# Patient Record
Sex: FEMALE | Race: BLACK OR AFRICAN AMERICAN | Employment: FULL TIME | RURAL
[De-identification: names, ages, dates, MRNs, and addresses within clinical notes are randomized per-mention and may not be internally consistent; named-entity substitution may affect disease eponyms.]

---

## 2017-08-21 ENCOUNTER — DOCUMENTATION ONLY (OUTPATIENT)
Dept: FAMILY MEDICINE CLINIC | Age: 47
End: 2017-08-21

## 2017-08-21 ENCOUNTER — OFFICE VISIT (OUTPATIENT)
Dept: FAMILY MEDICINE CLINIC | Age: 47
End: 2017-08-21

## 2017-08-21 VITALS
HEART RATE: 78 BPM | HEIGHT: 63 IN | RESPIRATION RATE: 18 BRPM | SYSTOLIC BLOOD PRESSURE: 134 MMHG | DIASTOLIC BLOOD PRESSURE: 84 MMHG | BODY MASS INDEX: 37.96 KG/M2 | WEIGHT: 214.25 LBS | OXYGEN SATURATION: 98 %

## 2017-08-21 DIAGNOSIS — Z23 IMMUNIZATION DUE: ICD-10-CM

## 2017-08-21 DIAGNOSIS — F41.8 DEPRESSION WITH ANXIETY: ICD-10-CM

## 2017-08-21 DIAGNOSIS — F41.9 ANXIETY: ICD-10-CM

## 2017-08-21 DIAGNOSIS — Z00.00 WELL ADULT EXAM: Primary | ICD-10-CM

## 2017-08-21 RX ORDER — LORAZEPAM 0.5 MG/1
TABLET ORAL
Qty: 60 TAB | Refills: 0 | OUTPATIENT
Start: 2017-08-21 | End: 2017-09-12

## 2017-08-21 NOTE — MR AVS SNAPSHOT
Visit Information Date & Time Provider Department Dept. Phone Encounter #  
 8/21/2017  8:00 AM Luis English MD [facility] [phone] [id number] Follow-up Instructions Return in about 1 year (around 8/21/2018). Upcoming Health Maintenance Date Due Pneumococcal 19-64 Medium Risk (1 of 1 - PPSV23) 11/23/1988 DTaP/Tdap/Td series (1 - Tdap) 11/23/1990 INFLUENZA AGE 9 TO ADULT 8/1/2017 PAP AKA CERVICAL CYTOLOGY 8/21/2020 Allergies as of 8/21/2017  Review Complete On: 8/21/2017 By: Luis English MD  
 No Known Allergies Current Immunizations  Never Reviewed Name Date Tdap 8/21/2017 Not reviewed this visit You Were Diagnosed With   
  
 Codes Comments Well adult exam    -  Primary ICD-10-CM: Z00.00 ICD-9-CM: V70.0 Anxiety     ICD-10-CM: F41.9 ICD-9-CM: 300.00 BMI 37.0-37.9, adult     ICD-10-CM: Z68.37 ICD-9-CM: V85.37 Depression with anxiety     ICD-10-CM: F41.8 ICD-9-CM: 300.4 Immunization due     ICD-10-CM: Z23 ICD-9-CM: V05.9 Vitals BP Pulse Resp Height(growth percentile) Weight(growth percentile) SpO2  
 134/84 (BP 1 Location: Right arm) 78 18 5' 3\" (1.6 m) 214 lb 4 oz (97.2 kg) 98% BMI OB Status Smoking Status 37.95 kg/m2 Having regular periods Never Smoker Vitals History BMI and BSA Data Body Mass Index Body Surface Area  
 37.95 kg/m 2 2.08 m 2 Preferred Pharmacy Pharmacy Name Phone [facility] [place] [address] [place] - [address] [phone] Your Updated Medication List  
  
   
This list is accurate as of: 8/21/17  8:43 AM.  Always use your most recent med list.  
  
  
  
  
 LORazepam 0.5 mg tablet Commonly known as:  ATIVAN  
TAKE ONE TABLET BY MOUTH EVERY 6 TO 8 HOURS AS NEEDED FOR ANXIETY  
  
 multivitamin tablet Commonly known as:  ONE A DAY Take 1 Tab by mouth daily. We Performed the Following CBC WITH AUTOMATED DIFF [21213 CPT(R)] COLLECTION VENOUS BLOOD,VENIPUNCTURE [id number] CPT(R)] LIPID PANEL [19397 CPT(R)] METABOLIC PANEL, COMPREHENSIVE [39826 CPT(R)] TETANUS, DIPHTHERIA TOXOIDS AND ACELLULAR PERTUSSIS VACCINE (TDAP), IN INDIVIDS. >=7, IM [id number] CPT(R)] TSH 3RD GENERATION [64542 CPT(R)] VITAMIN D, 25 HYDROXY [id number] CPT(R)] Follow-up Instructions Return in about 1 year (around 8/21/2018). To-Do List   
 08/21/2017 Imaging:  [facility] BI SCREENING INCL CAD Introducing [facility]! Doug Barba introduces inTarvo patient portal. Now you can access parts of your medical record, email your doctor's office, and request medication refills online. 1. In your internet browser, go to [email or web address] 2. Click on the First Time User? Click Here link in the Sign In box. You will see the New Member Sign Up page. 3. Enter your inTarvo Access Code exactly as it appears below. You will not need to use this code after youve completed the sign-up process. If you do not sign up before the expiration date, you must request a new code. · inTarvo Access Code: [email or web address] Expires: 11/19/2017  8:15 AM 
 
4. Enter the last four digits of your Social Security Number (xxxx) and Date of Birth (mm/dd/yyyy) as indicated and click Submit. You will be taken to the next sign-up page. 5. Create a inTarvo ID. This will be your inTarvo login ID and cannot be changed, so think of one that is secure and easy to remember. 6. Create a inTarvo password. You can change your password at any time. 7. Enter your Password Reset Question and Answer. This can be used at a later time if you forget your password. 8. Enter your e-mail address. You will receive e-mail notification when new information is available in [address] 9. Click Sign Up. You can now view and download portions of your medical record. 10. Click the Download Summary menu link to download a portable copy of your medical information. If you have questions, please visit the Frequently Asked Questions section of the documistic website. Remember, documistic is NOT to be used for urgent needs. For medical emergencies, dial 911. Now available from your iPhone and Android! Please provide this summary of care documentation to your next provider. Your primary care clinician is listed as Gulshan Figueroa. If you have any questions after today's visit, please call [phone].

## 2017-08-21 NOTE — PROGRESS NOTES
HISTORY OF PRESENT ILLNESS  Agueda rArieta is a 51 y.o. female. HPI  She is here for CPE. Doing well. Off meds for depression and doing well. Sees Dr Virgie Dailey for GYN care. Needs mammogram. LMP in November. Some symptoms of menopause. Reflux is stable without meds. She has gained a bit of weight over the last year. Not exercising. Does not drink sodas or alcohol. Review of Systems   Constitutional: Positive for malaise/fatigue. Negative for fever and weight loss. HENT: Negative for congestion, hearing loss, sinus pain and sore throat. Respiratory: Negative for cough, sputum production and wheezing. Cardiovascular: Negative for chest pain, palpitations and leg swelling. Gastrointestinal: Negative for abdominal pain, blood in stool, constipation, diarrhea, heartburn and nausea. Genitourinary: Negative for dysuria and urgency. Musculoskeletal: Negative for back pain and myalgias. Skin: Negative for rash. Neurological: Negative for dizziness, sensory change and headaches. Psychiatric/Behavioral: Negative for depression. The patient is nervous/anxious. The patient does not have insomnia. Improving     Past Medical History:   Diagnosis Date    Asthma     Depression     Depression with anxiety 5/12/2015    GERD (gastroesophageal reflux disease)      Past Surgical History:   Procedure Laterality Date    HX GYN  1994    tubal ligation     No Known Allergies  Blood pressure 134/84, pulse 78, resp. rate 18, height 5' 3\" (1.6 m), weight 214 lb 4 oz (97.2 kg), SpO2 98 %. Physical Exam   Constitutional: She is oriented to person, place, and time. She appears well-developed and well-nourished. No distress. HENT:   Head: Normocephalic and atraumatic. Right Ear: External ear normal.   Left Ear: External ear normal.   Nose: Nose normal.   Mouth/Throat: Oropharynx is clear and moist.   Eyes: Conjunctivae are normal.   Neck: Neck supple.    Cardiovascular: Normal rate, regular rhythm and normal heart sounds. Pulmonary/Chest: Effort normal and breath sounds normal. No respiratory distress. Abdominal: Soft. Bowel sounds are normal. She exhibits no distension. There is no tenderness. Genitourinary:   Genitourinary Comments: Breast exam: symmetrical, no masses palpated. No nipple discharge. No adenopathy. Musculoskeletal: Normal range of motion. Lymphadenopathy:     She has no cervical adenopathy. Neurological: She is alert and oriented to person, place, and time. Skin: Skin is warm. Psychiatric: She has a normal mood and affect. Her behavior is normal.   Vitals reviewed.       ASSESSMENT and PLAN  Well Exam   Check labs   Pap with Dr Ulisses Brito  BMI 37   Discussed diet and exercise   Plan to recheck in 6 months to see how she is progressing  Immun Due   Tdap today  Anxiety with depression- stable   Refill Ativan as needed #60   ADRs discussed

## 2017-08-22 LAB
25(OH)D3+25(OH)D2 SERPL-MCNC: 31.9 NG/ML (ref 30–100)
ALBUMIN SERPL-MCNC: 4 G/DL (ref 3.5–5.5)
ALBUMIN/GLOB SERPL: 1.4 {RATIO} (ref 1.2–2.2)
ALP SERPL-CCNC: 88 IU/L (ref 39–117)
ALT SERPL-CCNC: 23 IU/L (ref 0–32)
AST SERPL-CCNC: 23 IU/L (ref 0–40)
BASOPHILS # BLD AUTO: 0 X10E3/UL (ref 0–0.2)
BASOPHILS NFR BLD AUTO: 0 %
BILIRUB SERPL-MCNC: 0.6 MG/DL (ref 0–1.2)
BUN SERPL-MCNC: 14 MG/DL (ref 6–24)
BUN/CREAT SERPL: 20 (ref 9–23)
CALCIUM SERPL-MCNC: 9.2 MG/DL (ref 8.7–10.2)
CHLORIDE SERPL-SCNC: 105 MMOL/L (ref 96–106)
CHOLEST SERPL-MCNC: 182 MG/DL (ref 100–199)
CO2 SERPL-SCNC: 26 MMOL/L (ref 18–29)
CREAT SERPL-MCNC: 0.69 MG/DL (ref 0.57–1)
EOSINOPHIL # BLD AUTO: 1.6 X10E3/UL (ref 0–0.4)
EOSINOPHIL NFR BLD AUTO: 20 %
ERYTHROCYTE [DISTWIDTH] IN BLOOD BY AUTOMATED COUNT: 13.6 % (ref 12.3–15.4)
GLOBULIN SER CALC-MCNC: 2.9 G/DL (ref 1.5–4.5)
GLUCOSE SERPL-MCNC: 90 MG/DL (ref 65–99)
HCT VFR BLD AUTO: 39.8 % (ref 34–46.6)
HDLC SERPL-MCNC: 54 MG/DL
HGB BLD-MCNC: 13.2 G/DL (ref 11.1–15.9)
IMM GRANULOCYTES # BLD: 0 X10E3/UL (ref 0–0.1)
IMM GRANULOCYTES NFR BLD: 0 %
INTERPRETATION, 910389: NORMAL
LDLC SERPL CALC-MCNC: 116 MG/DL (ref 0–99)
LYMPHOCYTES # BLD AUTO: 2.8 X10E3/UL (ref 0.7–3.1)
LYMPHOCYTES NFR BLD AUTO: 35 %
MCH RBC QN AUTO: 29 PG (ref 26.6–33)
MCHC RBC AUTO-ENTMCNC: 33.2 G/DL (ref 31.5–35.7)
MCV RBC AUTO: 88 FL (ref 79–97)
MONOCYTES # BLD AUTO: 0.3 X10E3/UL (ref 0.1–0.9)
MONOCYTES NFR BLD AUTO: 4 %
MORPHOLOGY BLD-IMP: ABNORMAL
NEUTROPHILS # BLD AUTO: 3.2 X10E3/UL (ref 1.4–7)
NEUTROPHILS NFR BLD AUTO: 41 %
PLATELET # BLD AUTO: 278 X10E3/UL (ref 150–379)
POTASSIUM SERPL-SCNC: 4.4 MMOL/L (ref 3.5–5.2)
PROT SERPL-MCNC: 6.9 G/DL (ref 6–8.5)
RBC # BLD AUTO: 4.55 X10E6/UL (ref 3.77–5.28)
SODIUM SERPL-SCNC: 145 MMOL/L (ref 134–144)
TRIGL SERPL-MCNC: 59 MG/DL (ref 0–149)
TSH SERPL DL<=0.005 MIU/L-ACNC: 1.96 UIU/ML (ref 0.45–4.5)
VLDLC SERPL CALC-MCNC: 12 MG/DL (ref 5–40)
WBC # BLD AUTO: 7.9 X10E3/UL (ref 3.4–10.8)

## 2017-09-19 ENCOUNTER — OFFICE VISIT (OUTPATIENT)
Dept: FAMILY MEDICINE CLINIC | Age: 47
End: 2017-09-19

## 2017-09-19 VITALS
BODY MASS INDEX: 38.36 KG/M2 | OXYGEN SATURATION: 98 % | TEMPERATURE: 97.3 F | SYSTOLIC BLOOD PRESSURE: 130 MMHG | DIASTOLIC BLOOD PRESSURE: 84 MMHG | RESPIRATION RATE: 18 BRPM | HEART RATE: 70 BPM | HEIGHT: 63 IN | WEIGHT: 216.5 LBS

## 2017-09-19 DIAGNOSIS — M54.40 ACUTE RIGHT-SIDED LOW BACK PAIN WITH SCIATICA, SCIATICA LATERALITY UNSPECIFIED: Primary | ICD-10-CM

## 2017-09-19 RX ORDER — PREDNISONE 10 MG/1
10 TABLET ORAL SEE ADMIN INSTRUCTIONS
Qty: 21 TAB | Refills: 0 | Status: SHIPPED | OUTPATIENT
Start: 2017-09-19 | End: 2018-09-10 | Stop reason: ALTCHOICE

## 2017-09-19 NOTE — PROGRESS NOTES
HISTORY OF PRESENT ILLNESS  Tania Dykes is a 51 y.o. female. HPI  She is here for a follow up visit from the ER. Sse was seen a week ago with a horrible headache. CT of the head was negative except for a dental infection. Labs were normal. Treated with Augmentin. Headache is better. She is now having lower back pain radiating down her legs to her mid calf. Pain occurs when bending down or changing positions. No injury noted. Review of Systems   Constitutional: Positive for malaise/fatigue. Negative for fever. HENT: Negative for congestion and sore throat. Respiratory: Negative for cough. Cardiovascular: Negative for chest pain and palpitations. Gastrointestinal: Negative for abdominal pain. Musculoskeletal: Positive for back pain. Neurological: Positive for headaches. Negative for dizziness and sensory change. Past Medical History:   Diagnosis Date    Asthma     Depression     Depression with anxiety 5/12/2015    GERD (gastroesophageal reflux disease)      Past Surgical History:   Procedure Laterality Date    HX GYN  1994    tubal ligation     No Known Allergies  Blood pressure 130/84, pulse 70, temperature 97.3 °F (36.3 °C), temperature source Oral, resp. rate 18, height 5' 2.5\" (1.588 m), weight 216 lb 8 oz (98.2 kg), SpO2 98 %. Physical Exam   Constitutional: She is oriented to person, place, and time. She appears well-developed and well-nourished. No distress. HENT:   Head: Normocephalic. Right Ear: External ear normal.   Left Ear: External ear normal.   Nose: Nose normal.   Mouth/Throat: Oropharynx is clear and moist.   Neck: Neck supple. Cardiovascular: Normal rate, regular rhythm and normal heart sounds. Pulmonary/Chest: Effort normal and breath sounds normal. No respiratory distress. Musculoskeletal:   Pain with flexion and extension. DTRs 2+ bilaterally. Negative SLRT bilaterally. No pain with rotation of the hips.     Neurological: She is alert and oriented to person, place, and time. Skin: Skin is warm. Psychiatric: She has a normal mood and affect. Vitals reviewed. Results for orders placed or performed during the hospital encounter of 09/12/17   CBC WITH AUTOMATED DIFF   Result Value Ref Range    WBC 9.7 3.6 - 11.0 K/uL    RBC 4.61 3.80 - 5.20 M/uL    HGB 13.6 11.5 - 16.0 g/dL    HCT 40.4 35.0 - 47.0 %    MCV 87.6 80.0 - 99.0 FL    MCH 29.5 26.0 - 34.0 PG    MCHC 33.7 30.0 - 36.5 g/dL    RDW 12.8 11.5 - 14.5 %    PLATELET 799 223 - 790 K/uL    NEUTROPHILS 62 32 - 75 %    LYMPHOCYTES 29 12 - 49 %    MONOCYTES 6 5 - 13 %    EOSINOPHILS 3 0 - 7 %    BASOPHILS 0 0 - 1 %    ABS. NEUTROPHILS 6.1 1.8 - 8.0 K/UL    ABS. LYMPHOCYTES 2.8 0.8 - 3.5 K/UL    ABS. MONOCYTES 0.5 0.0 - 1.0 K/UL    ABS. EOSINOPHILS 0.2 0.0 - 0.4 K/UL    ABS. BASOPHILS 0.0 0.0 - 0.1 K/UL   METABOLIC PANEL, COMPREHENSIVE   Result Value Ref Range    Sodium 142 136 - 145 mmol/L    Potassium 4.1 3.5 - 5.1 mmol/L    Chloride 106 97 - 108 mmol/L    CO2 28 21 - 32 mmol/L    Anion gap 8 5 - 15 mmol/L    Glucose 110 (H) 65 - 100 mg/dL    BUN 12 7.0 - 18.0 MG/DL    Creatinine 0.81 0.55 - 1.02 MG/DL    BUN/Creatinine ratio 15 12 - 20      GFR est AA >60 >60 ml/min/1.73m2    GFR est non-AA >60 >60 ml/min/1.73m2    Calcium 9.5 8.5 - 10.1 MG/DL    Bilirubin, total 0.8 0.2 - 1.0 MG/DL    ALT (SGPT) 29 14 - 63 U/L    AST (SGOT) 23 15 - 37 U/L    Alk.  phosphatase 77 45 - 117 U/L    Protein, total 7.5 6.4 - 8.2 g/dL    Albumin 3.4 (L) 3.5 - 5.0 g/dL    Globulin 4.1 (H) 2.0 - 4.0 g/dL    A-G Ratio 0.8 (L) 1.1 - 2.2     PROTHROMBIN TIME + INR   Result Value Ref Range    INR 1.0 0.9 - 1.1      Prothrombin time 11.7 10.0 - 13.0 sec   PTT   Result Value Ref Range    aPTT 24.6 23.0 - 33.5 SEC       ASSESSMENT and PLAN  Lumbar back pain with radiation down both legs   Get Xrays of the lumbar spine   SteraPred taper over 6 days   RTO or call if no improvement

## 2018-09-10 PROBLEM — F51.01 PRIMARY INSOMNIA: Status: ACTIVE | Noted: 2018-09-10

## 2018-09-10 PROBLEM — E66.01 OBESITY, MORBID (HCC): Status: ACTIVE | Noted: 2018-09-10

## 2019-06-26 PROBLEM — K42.9 UMBILICAL HERNIA: Chronic | Status: ACTIVE | Noted: 2019-06-26

## 2019-06-27 PROBLEM — N85.2 ENLARGED UTERUS: Chronic | Status: ACTIVE | Noted: 2019-06-27

## 2019-06-27 PROBLEM — K76.0 FATTY INFILTRATION OF LIVER: Chronic | Status: ACTIVE | Noted: 2019-06-27

## 2019-07-15 ENCOUNTER — HOSPITAL ENCOUNTER (EMERGENCY)
Age: 49
Discharge: HOME | End: 2019-07-15
Attending: EMERGENCY MEDICINE
Payer: COMMERCIAL

## 2019-07-15 VITALS
WEIGHT: 218.92 LBS | RESPIRATION RATE: 14 BRPM | DIASTOLIC BLOOD PRESSURE: 80 MMHG | SYSTOLIC BLOOD PRESSURE: 122 MMHG | HEART RATE: 80 BPM | OXYGEN SATURATION: 99 % | HEIGHT: 63 IN | BODY MASS INDEX: 38.79 KG/M2 | TEMPERATURE: 97.7 F

## 2019-07-15 DIAGNOSIS — K21.9 GASTROESOPHAGEAL REFLUX DISEASE WITHOUT ESOPHAGITIS: Primary | ICD-10-CM

## 2019-07-15 DIAGNOSIS — R11.0 NAUSEA WITHOUT VOMITING: ICD-10-CM

## 2019-07-15 PROCEDURE — 74011250637 HC RX REV CODE- 250/637: Performed by: NURSE PRACTITIONER

## 2019-07-15 PROCEDURE — 74011000250 HC RX REV CODE- 250: Performed by: NURSE PRACTITIONER

## 2019-07-15 PROCEDURE — 93005 ELECTROCARDIOGRAM TRACING: CPT

## 2019-07-15 PROCEDURE — 99284 EMERGENCY DEPT VISIT MOD MDM: CPT

## 2019-07-15 RX ORDER — OMEPRAZOLE 20 MG/1
20 CAPSULE, DELAYED RELEASE ORAL 2 TIMES DAILY
Qty: 30 CAP | Refills: 0 | Status: SHIPPED | OUTPATIENT
Start: 2019-07-15 | End: 2019-07-31 | Stop reason: DRUGHIGH

## 2019-07-15 RX ORDER — ALUMINA, MAGNESIA, AND SIMETHICONE 2400; 2400; 240 MG/30ML; MG/30ML; MG/30ML
10 SUSPENSION ORAL
Qty: 240 ML | Refills: 0 | Status: SHIPPED | OUTPATIENT
Start: 2019-07-15 | End: 2020-11-04 | Stop reason: ALTCHOICE

## 2019-07-15 RX ORDER — OMEPRAZOLE 20 MG/1
20 CAPSULE, DELAYED RELEASE ORAL 2 TIMES DAILY
Qty: 30 CAP | Refills: 0 | Status: SHIPPED | OUTPATIENT
Start: 2019-07-15 | End: 2019-07-15

## 2019-07-15 RX ADMIN — ALUMINUM HYDROXIDE AND MAGNESIUM HYDROXIDE 40 ML: 200; 200 SUSPENSION ORAL at 20:57

## 2019-07-15 NOTE — ED TRIAGE NOTES
Being treated for gastric reflux for 3 weeks on Prilosec. But continues to have chest pain and pressure stomach whenever she eats. 4 lb weight loss in past 3 weeks.

## 2019-07-15 NOTE — LETTER
[address] 
[facility] EMERGENCY DEPT 
[address] [address] 
[phone] Work/School Note Date: 7/15/2019 To Whom It May concern: 
 
Cesar Madden was seen and treated today in the emergency room by the following provider(s): 
Attending Provider: Diego Hollins MD 
Nurse Practitioner: Jeremias Hurd NP. Cesar Madden may return to work on 7/18/19.  
 
Sincerely, 
 
 
 
 
July East RN

## 2019-07-16 LAB
ATRIAL RATE: 77 BPM
CALCULATED P AXIS, ECG09: 61 DEGREES
CALCULATED R AXIS, ECG10: 28 DEGREES
CALCULATED T AXIS, ECG11: 22 DEGREES
DIAGNOSIS, 93000: NORMAL
P-R INTERVAL, ECG05: 146 MS
Q-T INTERVAL, ECG07: 402 MS
QRS DURATION, ECG06: 72 MS
QTC CALCULATION (BEZET), ECG08: 454 MS
VENTRICULAR RATE, ECG03: 77 BPM

## 2019-07-16 NOTE — ED PROVIDER NOTES
EMERGENCY DEPARTMENT HISTORY AND PHYSICAL EXAM      Date: 7/15/2019  Patient Name: Lexie Lucas    History of Presenting Illness     Chief Complaint   Patient presents with    Abdominal Pain       History Provided By: Patient    HPI: Lexie Lucas, 51 y.o. female with PMHx significant for GERD, presents by POV to the ED with cc of burning sensation for the past couple of days. Seen the PCP on June 16, given Prilosec for management. Patient states she has been taking the Prilosec for almost a month now with some relief. States she takes the Prilosec early in the morning however, throughout the day she starts to feel the medication wear off and she begins to feel a burning sensation along with some nausea. Also states her appetite has changed because she does not want to eat much during burning sensation in her chest.  States she does not go back to her primary care doctor for about another week or so and does not think she can make it until she sees the doctor next week. This evening the patient describes a burning sensation very similar to previous experience with acid reflux. States she just wanted a second opinion. Patient denies chest pain, shortness of breath, abdominal pain, however, she is describing nausea without vomiting, and acid taste within her mouth. Patient states she took her prescribed dosage early this morning however, as she described earlier, the burning sensation has come back. There are no other complaints, changes, or physical findings at this time. PCP: Irma Phillips MD    No current facility-administered medications on file prior to encounter. Current Outpatient Medications on File Prior to Encounter   Medication Sig Dispense Refill    omeprazole (PRILOSEC) 40 mg capsule Take 1 Cap by mouth daily. 30 Cap 3    ondansetron (ZOFRAN ODT) 8 mg disintegrating tablet Take 1 Tab by mouth every eight (8) hours as needed for Nausea.  30 Tab 0    polyethylene glycol (MIRALAX) 17 gram packet Take 1 Packet by mouth daily as needed (constipation, abdominal fullness). 14 Packet 3    LORazepam (ATIVAN) 0.5 mg tablet TAKE ONE TABLET BY MOUTH EVERY 6 TO 8 HOURS AS NEEDED FOR ANXIETY 30 Tab 0    docosahexanoic acid/epa (FISH OIL PO) Take  by mouth.  BIOTIN PO Take  by mouth.  multivitamin (ONE A DAY) tablet Take 1 Tab by mouth daily. Past History     Past Medical History:  Past Medical History:   Diagnosis Date    Asthma     Depression     Depression with anxiety 5/12/2015    GERD (gastroesophageal reflux disease)     Menopause        Past Surgical History:  Past Surgical History:   Procedure Laterality Date    HX GYN  1994    tubal ligation       Family History:  No family history on file. Social History:  Social History     Tobacco Use    Smoking status: Never Smoker    Smokeless tobacco: Never Used   Substance Use Topics    Alcohol use: No     Alcohol/week: 0.0 standard drinks    Drug use: Not on file       Allergies:  No Known Allergies      Review of Systems   Review of Systems   Constitutional: Negative for chills and fever. HENT: Negative for congestion, rhinorrhea and sore throat. Respiratory: Positive for cough. Negative for apnea, choking, chest tightness, shortness of breath and wheezing. Cardiovascular: Negative for chest pain. Gastrointestinal: Negative for abdominal pain, nausea and vomiting. Endocrine: Negative for polyuria. Genitourinary: Negative for dysuria and frequency. Musculoskeletal: Negative for arthralgias, back pain and joint swelling. Skin: Negative for rash. Neurological: Negative for dizziness, weakness and headaches. All other systems reviewed and are negative. Physical Exam   Physical Exam   Constitutional: She is oriented to person, place, and time. She appears well-developed and well-nourished. No distress. 51 y.o. female   HENT:   Head: Normocephalic and atraumatic.    Eyes: Pupils are equal, round, and reactive to light. Conjunctivae are normal.   Neck: Normal range of motion. Neck supple. No tracheal deviation present. No thyromegaly present. Cardiovascular: Normal rate, regular rhythm and normal heart sounds. No murmur heard. Pulmonary/Chest: Effort normal and breath sounds normal. No respiratory distress. She has no wheezes. Abdominal: Soft. Bowel sounds are normal. She exhibits no distension and no mass. There is no rebound and no guarding. Musculoskeletal: Normal range of motion. She exhibits no edema, tenderness or deformity. Neurological: She is alert and oriented to person, place, and time. Skin: Skin is warm and dry. She is not diaphoretic. Psychiatric: She has a normal mood and affect. Her behavior is normal.   Nursing note and vitals reviewed. Diagnostic Study Results     Labs -     No results found for this or any previous visit (from the past 12 hour(s)). Radiologic Studies -   No orders to display       Medical Decision Making   I am the first provider for this patient. I reviewed the vital signs, available nursing notes, past medical history, past surgical history, family history and social history. Vital Signs-Reviewed the patient's vital signs. No data found. Records Reviewed: Nursing Notes and Old Medical Records    Provider Notes (Medical Decision Making):   Differential diagnoses include peptic ulcer however I do not think this is the case with this patient due to mild pain and no discussion of vomiting blood with nausea, or nonulcer dyspepsia. ED Course:   Initial assessment performed. The patients presenting problems have been discussed, and they are in agreement with the care plan formulated and outlined with them. I have encouraged them to ask questions as they arise throughout their visit. Patient received GI cocktail in the emergency room with relief. Patient states the chest burning sensation has gone away.   I advised patient to follow-up with gastroenterology in the next day or so if she could get an appointment. However maintain appointment scheduled for next week. Also asked the patient to consider instead of taking 40 mg of Prilosec in the morning if she could change and take 20 mg in the morning and 20 mg in the evening to help with some of the evening and nighttime discomfort. Patient agreed. Advised patient if the pain symptoms increased more than usual or new symptoms arise to please come back to the emergency room for further evaluation. Critical Care Time: None    Disposition:  DISCHARGE NOTE:  8:17 PM  The pt is ready for discharge. The pt's signs, symptoms, diagnosis, and discharge instructions have been discussed and pt has conveyed their understanding. The pt is to follow up as recommended or return to ER should their symptoms worsen. Plan has been discussed and pt is in agreement. Christian lau NP  07/15/2019    PLAN:  1. Discharge Medication List as of 7/15/2019  9:22 PM      START taking these medications    Details   aluminum & magnesium hydroxide-simethicone (MAALOX MAXIMUM STRENGTH) 400-400-40 mg/5 mL suspension Take 10 mL by mouth every six (6) hours as needed for Indigestion. , Print, Disp-240 mL, R-0      !! omeprazole (PRILOSEC) 20 mg capsule Take 1 Cap by mouth two (2) times a day., Print, Disp-30 Cap, R-0       !! - Potential duplicate medications found. Please discuss with provider. CONTINUE these medications which have NOT CHANGED    Details   !! omeprazole (PRILOSEC) 40 mg capsule Take 1 Cap by mouth daily. , Normal, Disp-30 Cap, R-3      ondansetron (ZOFRAN ODT) 8 mg disintegrating tablet Take 1 Tab by mouth every eight (8) hours as needed for Nausea., Normal, Disp-30 Tab, R-0      polyethylene glycol (MIRALAX) 17 gram packet Take 1 Packet by mouth daily as needed (constipation, abdominal fullness). , No Print, Disp-14 Packet, R-3      LORazepam (ATIVAN) 0.5 mg tablet TAKE ONE TABLET BY MOUTH EVERY 6 TO 8 HOURS AS NEEDED FOR ANXIETY, Phone In, Disp-30 Tab, R-0      docosahexanoic acid/epa (FISH OIL PO) Take  by mouth., Historical Med      BIOTIN PO Take  by mouth., Historical Med      multivitamin (ONE A DAY) tablet Take 1 Tab by mouth daily. , Historical Med       !! - Potential duplicate medications found. Please discuss with provider. 2.   Follow-up Information     Follow up With Specialties Details Why Contact Info    [facility] EMERGENCY DEPT Emergency Medicine In 3 days As needed, If symptoms worsen [address] [phone]    Jessica Garrido MD [facility] Schedule an appointment as soon as possible for a visit As needed, If symptoms worsen [address] [place] [address].      Mynor Almazan MD Gastroenterology Schedule an appointment as soon as possible for a visit As needed, If symptoms worsen Phandion Gates Nancy [address] [place] [address]  [phone]          Return to ED if worse     Diagnosis     Clinical Impression:   1. Gastroesophageal reflux disease without esophagitis    2. Nausea without vomiting          Please note that this dictation was completed with [employer], the [employer] voice recognition software. Quite often unanticipated grammatical, syntax, homophones, and other interpretive errors are inadvertently transcribed by the computer software. Please disregards these errors. Please excuse any errors that have escaped final proofreading. This note will not be viewable in [address]

## 2019-07-16 NOTE — DISCHARGE INSTRUCTIONS
Patient Education        Upper GI Endoscopy: Before Your Child's Procedure  What is an upper GI endoscopy? An upper gastrointestinal (or GI) endoscopy is a test that allows your doctor to look at the inside of your child's esophagus, stomach, and the first part of the small intestine, called the duodenum. The esophagus is the tube that carries food to the stomach. The doctor uses a thin, lighted tube that bends. It is called an endoscope, or scope. The scope is a flexible video camera. The doctor looks at a monitor (like a TV set or a computer screen) as he or she moves the scope. The doctor puts the tip of the scope in your child's mouth and gently moves it down the throat. A doctor may do this procedure to look for the cause of belly pain or bleeding. It also can be used to look for signs of acid backing up into the esophagus. This is called gastroesophageal reflux disease, or GERD. The doctor can use the scope to take a sample of tissue for study (a biopsy). The doctor also can use the scope to take out growths or stop bleeding. You can take your child home after your doctor checks to make sure your child is not having any problems. Your child may stay overnight if your doctor did a biopsy or treatment during the test.  Follow-up care is a key part of your child's treatment and safety. Be sure to make and go to all appointments, and call your doctor if your child is having problems. It's also a good idea to know your child's test results and keep a list of the medicines your child takes. What happens before the procedure?  Addi Felder a procedure can be stressful both for your child and for you. This information will help you understand what you can expect. And it will help you safely prepare for your child's procedure.   Preparing for the procedure    · Understand exactly what procedure is planned, along with the risks, benefits, and other options.     · Tell the doctors ALL the medicines, vitamins, supplements, and herbal remedies your child takes. Some of these can increase the risk of bleeding or interact with anesthesia. Your doctor will tell you which medicines your child should take or stop before the procedure.     · Talk to your child about the procedure. Tell your child that the endoscopy will help find what's causing problems in his or her belly. Hospitals know how to take care of children. The staff will do all they can to make it easier for your child.     · Plan for your child's recovery time. He or she may need more of your time right after the surgery, both for care and for comfort.    The day before the procedure    · A nurse may call you (or you may need to call the hospital). This is to confirm the time and date of your child's procedure and answer any questions.     · Remember to follow your doctor's instructions about your child taking or stopping medicines before the procedure. This includes over-the-counter medicines. What happens on the day of the procedure? · Follow the instructions exactly about when your child should stop eating and drinking. If you don't, the the procedure may be canceled. If the doctor told you to have your child take his or her medicines on the day of the procedure, have your child take them with only a sip of water.     · Have your child take a bath or shower before you come in. Do not apply lotion or deodorant.     · Your child may brush his or her teeth. But tell your child not to swallow any toothpaste or water.     · Do not let your child wear contact lenses. Bring your child's glasses or contact lens case.     · Be sure your child has something that reminds him or her of home. A special stuffed animal, toy, or blanket may be comforting. For an older child, it might be a book or music.    At the hospital or clinic    · A parent or legal guardian must accompany your child.     · Your child will be kept comfortable and safe by the anesthesia provider.  The doctor may spray medicine on the back of your child's throat to numb it. Your child also will get medicine to prevent pain and help him or her relax. Some children find that they do not remember having the test because of the medicine.     · The procedure usually takes 15 to 30 minutes.     · After the procedure, your child will be taken to the recovery room. As your child wakes up, the recovery room staff will monitor his or her condition. The doctor will talk to you about the procedure.     · You will probably be able to take your child home about 1 to 2 hours after the procedure.     · Your child will lie on the left side. The doctor will put the scope in your child's mouth and toward the back of the throat. The doctor will tell your child when to swallow. This helps the scope move down the throat. Your child will be able to breathe normally. The doctor will move the scope down the esophagus into the stomach. The doctor also may look at the duodenum.     · If your doctor wants to take a sample of tissue for a biopsy, he or she may use small surgical tools, which are put into the scope, to cut off some tissue. Your child will not feel a biopsy. The doctor also can use the tools to stop bleeding or to do other treatments. Going home  · Expect your child to be sleepy. Encourage extra rest the first day. Most children can be more active on the day after the procedure. · Follow your doctor's instructions about when your child can do vigorous exercise. This includes sports, running, and physical education. · When you leave the hospital, you will get more information about how to take care of your child at home. · The doctor or nurse will tell you when your child can start normal activities again. When should you call your doctor?    · You have questions or concerns.     · You don't understand how to prepare your child for the procedure.     · Your child becomes ill before the procedure (such as fever, flu, or a cold).     · You need to reschedule or have changed your mind about your child having the procedure. Where can you learn more? Go to http://genaro-aranza.info/. Enter I137 in the search box to learn more about \"Upper GI Endoscopy: Before Your Child's Procedure. \"  Current as of: March 27, 2018  Content Version: 11.9  © 3757-8926 [employer]. Care instructions adapted under license by [employer] (which disclaims liability or warranty for this information). If you have questions about a medical condition or this instruction, always ask your healthcare professional. [place] [address] any warranty or liability for your use of this information. Patient Education        Gastroesophageal Reflux Disease (GERD): Care Instructions  Your Care Instructions    Gastroesophageal reflux disease (GERD) is the backward flow of stomach acid into the esophagus. The esophagus is the tube that leads from your throat to your stomach. A one-way valve prevents the stomach acid from moving up into this tube. When you have GERD, this valve does not close tightly enough. If you have mild GERD symptoms including heartburn, you may be able to control the problem with antacids or over-the-counter medicine. Changing your diet, losing weight, and making other lifestyle changes can also help reduce symptoms. Follow-up care is a key part of your treatment and safety. Be sure to make and go to all appointments, and call your doctor if you are having problems. It's also a good idea to know your test results and keep a list of the medicines you take. How can you care for yourself at home? · Take your medicines exactly as prescribed. Call your doctor if you think you are having a problem with your medicine. · Your doctor may recommend over-the-counter medicine. For mild or occasional indigestion, antacids, such as Tums, Gaviscon, Mylanta, or Maalox, may help.  Your doctor also may recommend over-the-counter acid reducers, such as Pepcid AC, Tagamet HB, Zantac 75, or Prilosec. Read and follow all instructions on the label. If you use these medicines often, talk with your doctor. · Change your eating habits. ? It's best to eat several small meals instead of two or three large meals. ? After you eat, wait 2 to 3 hours before you lie down. ? Chocolate, mint, and alcohol can make GERD worse. ? Spicy foods, foods that have a lot of acid (like tomatoes and oranges), and coffee can make GERD symptoms worse in some people. If your symptoms are worse after you eat a certain food, you may want to stop eating that food to see if your symptoms get better. · Do not smoke or chew tobacco. Smoking can make GERD worse. If you need help quitting, talk to your doctor about stop-smoking programs and medicines. These can increase your chances of quitting for good. · If you have GERD symptoms at night, raise the head of your bed 6 to 8 inches by putting the frame on blocks or placing a foam wedge under the head of your mattress. (Adding extra pillows does not work.)  · Do not wear tight clothing around your middle. · Lose weight if you need to. Losing just 5 to 10 pounds can help. When should you call for help? Call your doctor now or seek immediate medical care if:    · You have new or different belly pain.     · Your stools are black and tarlike or have streaks of blood.    Watch closely for changes in your health, and be sure to contact your doctor if:    · Your symptoms have not improved after 2 days.     · Food seems to catch in your throat or chest.   Where can you learn more? Go to http://genaro-aranza.info/. Enter A176 in the search box to learn more about \"Gastroesophageal Reflux Disease (GERD): Care Instructions. \"  Current as of: March 27, 2018  Content Version: 11.9  © 0686-9051 [employer].  Care instructions adapted under license by [employer] (which disclaims liability or warranty for this information). If you have questions about a medical condition or this instruction, always ask your healthcare professional. [address] any warranty or liability for your use of this information.

## 2019-09-13 ENCOUNTER — HOSPITAL ENCOUNTER (OUTPATIENT)
Dept: NUCLEAR MEDICINE | Age: 49
Discharge: HOME | End: 2019-09-13
Attending: INTERNAL MEDICINE
Payer: COMMERCIAL

## 2019-09-13 DIAGNOSIS — Z12.11 SCREENING FOR COLON CANCER: ICD-10-CM

## 2019-09-13 DIAGNOSIS — K21.9 GERD (GASTROESOPHAGEAL REFLUX DISEASE): ICD-10-CM

## 2019-09-13 DIAGNOSIS — R68.81 EARLY SATIETY: ICD-10-CM

## 2019-09-13 DIAGNOSIS — R11.0 NAUSEA: ICD-10-CM

## 2019-09-13 PROCEDURE — 78264 GASTRIC EMPTYING IMG STUDY: CPT

## 2020-11-19 PROBLEM — E78.00 PURE HYPERCHOLESTEROLEMIA: Chronic | Status: ACTIVE | Noted: 2020-11-19

## 2021-06-03 ENCOUNTER — OFFICE VISIT (OUTPATIENT)
Dept: FAMILY MEDICINE CLINIC | Age: 51
End: 2021-06-03
Payer: COMMERCIAL

## 2021-06-03 VITALS
OXYGEN SATURATION: 100 % | WEIGHT: 213.5 LBS | DIASTOLIC BLOOD PRESSURE: 84 MMHG | HEART RATE: 71 BPM | HEIGHT: 63 IN | BODY MASS INDEX: 37.83 KG/M2 | TEMPERATURE: 98.7 F | RESPIRATION RATE: 18 BRPM | SYSTOLIC BLOOD PRESSURE: 129 MMHG

## 2021-06-03 DIAGNOSIS — R42 DIZZINESS: Primary | ICD-10-CM

## 2021-06-03 DIAGNOSIS — H61.21 IMPACTED CERUMEN OF RIGHT EAR: ICD-10-CM

## 2021-06-03 PROCEDURE — 69209 REMOVE IMPACTED EAR WAX UNI: CPT | Performed by: NURSE PRACTITIONER

## 2021-06-03 PROCEDURE — 99213 OFFICE O/P EST LOW 20 MIN: CPT | Performed by: NURSE PRACTITIONER

## 2021-06-03 PROCEDURE — 36415 COLL VENOUS BLD VENIPUNCTURE: CPT | Performed by: NURSE PRACTITIONER

## 2021-06-03 NOTE — PROGRESS NOTES
Subjective:     Chief Complaint   Patient presents with   Julia Stubbs is a 45 y.o. female who presents today with c/o dizziness that started over the past couple of weeks. Every morning she wakes up with a mild headache or \"misery\" across her forehead. Sometimes feels like she is off balance but denies any sensation of the room spinning. Then yesterday she came home from work and felt like she was going to pass out. She checked her BP and it was 120/84. She laid down to rest and felt better a little later. She has tried increasing her water intake. When asked about vision changes she states the other day while looking at the computer she saw some \"squiggly lines\". She does not have HTN and takes no BP meds. She does have HLD but is not on a statin. Patient Active Problem List   Diagnosis Code    GERD (gastroesophageal reflux disease) K21.9    Asthma J45.909    Depression with anxiety F41.8    Anxiety F41.9    Obesity, morbid ([facility] [address] E66.01    BMI 40.0-44.9, adult ([facility] [address] Z68.41    Primary insomnia N21.96    Umbilical hernia P23.9    Fatty infiltration of liver K76.0    Enlarged uterus N85.2    Pure hypercholesterolemia E78.00       Past Medical History:   Diagnosis Date    Asthma     Depression     Depression with anxiety 5/12/2015    GERD (gastroesophageal reflux disease)     Menopause          Current Outpatient Medications:     omeprazole (PRILOSEC) 20 mg capsule, Take 1 capsule by mouth twice daily, Disp: 180 Cap, Rfl: 1    docosahexanoic acid/epa (FISH OIL PO), Take  by mouth., Disp: , Rfl:     multivitamin (ONE A DAY) tablet, Take 1 Tab by mouth daily. , Disp: , Rfl:     BIOTIN PO, Take  by mouth.  (Patient not taking: Reported on 6/3/2021), Disp: , Rfl:     No Known Allergies    Past Surgical History:   Procedure Laterality Date    HX GYN  1994    tubal ligation       Social History     Tobacco Use   Smoking Status Never Smoker   Smokeless Tobacco Never Used Social History     Socioeconomic History    Marital status:      Spouse name: Not on file    Number of children: Not on file    Years of education: Not on file    Highest education level: Not on file   Tobacco Use    Smoking status: Never Smoker    Smokeless tobacco: Never Used   Substance and Sexual Activity    Alcohol use: No     Alcohol/week: 0.0 standard drinks     Social Determinants of Health     Financial Resource Strain:     Difficulty of Paying Living Expenses:    Food Insecurity:     Worried About Running Out of Food in the Last Year:     [address] in the Last Year:    Transportation Needs:     Lack of Transportation (Medical):  Lack of Transportation (Non-Medical):    Physical Activity:     Days of Exercise per Week:     Minutes of Exercise per Session:    Stress:     Feeling of Stress :    Social Connections:     Frequency of Communication with Friends and Family:     Frequency of Social Gatherings with Friends and Family:     Attends [employer] Services:     Active Member of Clubs or Organizations:     Attends Club or Organization Meetings:     Marital Status:        History reviewed. No pertinent family history. ROS:  Gen: denies fever, chills, or fatigue  HEENT:+mild frontal headache Denies nasal congestion or drainage, ear pain, ear fullness, ear drainage, tinnitus, or hearing loss. +vision changes: brief episode of \"squiggly lines\" Denies sore throat. Resp: denies dyspnea, cough, or wheezing  CV: denies chest pain, pressure, or palpitations  Extremeties: denies edema  GI[de-identified] +occas nausea, denies abdominal pain, vomiting, diarrhea, or constipation.  Denies melena or hematochezia   : denies dysuria, hematuria, urinary frequency or urgency  Musculoskeletal: no joint pain, stiffness, or muscle cramps  Neuro: denies numbness/tingling, tremor, or seizure activity. + dizziness/ lightheadedness  Skin: denies rashes or new lesions   Psych: denies anxiety, depression, bridget, or other changes in mood    Objective:     Visit Vitals  /84 (BP 1 Location: Left arm, BP Patient Position: Sitting)   Pulse 71   Temp 98.7 °F (37.1 °C) (Temporal)   Resp 18   Ht 5' 2.5\" (1.588 m)   Wt 213 lb 8 oz (96.8 kg)   SpO2 100%   BMI 38.43 kg/m²     Body mass index is 38.43 kg/m². General: Alert and oriented. No acute distress. Well nourished. HEENT :  Ears:Right: +cerumen impaction  Left: TM normal. Canal clear. Eyes: Sclera white, conjunctiva clear. PERRLA. Extra ocular movements intact. Neck: Supple with FROM. No thyroid-megaly, carotid bruits, or lymphadenopathy  Lungs: Breathing even and unlabored. All lobes clear to auscultation bilaterally   Heart :RRR, S1 and S2 normal intensity, no extra heart sounds  Extremities: Non-edematous  Neuro: Cranial nerves grossly normal.  Psych: Mood and thought content appropriate for situation. Dressed appropriately and with good hygiene. Skin: Warm, dry, and intact. No lesions or discoloration. Assessment/ Plan:     1. Dizziness/lightheadedness  Check labs including CBC, CMP, mag, TSH, and B12- will call with results  If normal, consider CT of brain and carotid ultrasound given headache, vision changes, and nausea  Go to ER for CP, SOB, syncope, or other emergencies    2. Cerumen impaction, right ear  Right ear flushed with peroxide and water. Wax expelled  TM healthy looking, canal non-erythematous  F/U prn ear pain, drainage, or other concerns      Verbal and written instructions (see AVS) provided.  Patient expresses understanding of diagnosis and treatment plan. Health Maintenance Due   Topic Date Due    Hepatitis C Screening  Never done    Pneumococcal 0-64 years (1 of 2 - PPSV23) Never done    COVID-19 Vaccine (1) Never done    Shingrix Vaccine Age 50> (1 of 2) Never done               Miguel Dunbar, MARISELA

## 2021-06-03 NOTE — PROGRESS NOTES
1. Have you been to the ER, urgent care clinic since your last visit? Hospitalized since your last visit? No    2. Have you seen or consulted any other health care providers outside of the [address] [place] [address] since your last visit? Include any pap smears or colon screening. No     Chief Complaint   Patient presents with    Dizziness     Visit Vitals  /84 (BP 1 Location: Left arm, BP Patient Position: Sitting)   Pulse 71   Temp 98.7 °F (37.1 °C) (Temporal)   Resp 18   Ht 5' 2.5\" (1.588 m)   Wt 213 lb 8 oz (96.8 kg)   SpO2 100%   BMI 38.43 kg/m²     RIght ear lavage completed. Warm water and Hydrogen peroxide mix used to remove cerumen from right ear. Right Canal cleared after 3 lavages.

## 2021-06-04 DIAGNOSIS — R51.9 HEADACHE ABOVE THE EYE REGION: ICD-10-CM

## 2021-06-04 DIAGNOSIS — R42 LIGHTHEADEDNESS: Primary | ICD-10-CM

## 2021-06-04 LAB
ALBUMIN SERPL-MCNC: 3.8 G/DL (ref 3.5–5)
ALBUMIN/GLOB SERPL: 1.1 {RATIO} (ref 1.1–2.2)
ALP SERPL-CCNC: 82 U/L (ref 45–117)
ALT SERPL-CCNC: 24 U/L (ref 12–78)
ANION GAP SERPL CALC-SCNC: 1 MMOL/L (ref 5–15)
AST SERPL-CCNC: 12 U/L (ref 15–37)
BILIRUB SERPL-MCNC: 0.9 MG/DL (ref 0.2–1)
BUN SERPL-MCNC: 11 MG/DL (ref 6–20)
BUN/CREAT SERPL: 18 (ref 12–20)
CALCIUM SERPL-MCNC: 9.4 MG/DL (ref 8.5–10.1)
CHLORIDE SERPL-SCNC: 109 MMOL/L (ref 97–108)
CO2 SERPL-SCNC: 31 MMOL/L (ref 21–32)
CREAT SERPL-MCNC: 0.62 MG/DL (ref 0.55–1.02)
ERYTHROCYTE [DISTWIDTH] IN BLOOD BY AUTOMATED COUNT: 12.6 % (ref 11.5–14.5)
GLOBULIN SER CALC-MCNC: 3.6 G/DL (ref 2–4)
GLUCOSE SERPL-MCNC: 84 MG/DL (ref 65–100)
HCT VFR BLD AUTO: 41.4 % (ref 35–47)
HGB BLD-MCNC: 13.7 G/DL (ref 11.5–16)
MAGNESIUM SERPL-MCNC: 2.4 MG/DL (ref 1.6–2.4)
MCH RBC QN AUTO: 29.7 PG (ref 26–34)
MCHC RBC AUTO-ENTMCNC: 33.1 G/DL (ref 30–36.5)
MCV RBC AUTO: 89.6 FL (ref 80–99)
NRBC # BLD: 0 K/UL (ref 0–0.01)
NRBC BLD-RTO: 0 PER 100 WBC
PLATELET # BLD AUTO: 242 K/UL (ref 150–400)
PMV BLD AUTO: 11.6 FL (ref 8.9–12.9)
POTASSIUM SERPL-SCNC: 4.3 MMOL/L (ref 3.5–5.1)
PROT SERPL-MCNC: 7.4 G/DL (ref 6.4–8.2)
RBC # BLD AUTO: 4.62 M/UL (ref 3.8–5.2)
SODIUM SERPL-SCNC: 141 MMOL/L (ref 136–145)
TSH SERPL DL<=0.05 MIU/L-ACNC: 1.24 UIU/ML (ref 0.36–3.74)
VIT B12 SERPL-MCNC: 454 PG/ML (ref 193–986)
WBC # BLD AUTO: 8 K/UL (ref 3.6–11)

## 2021-06-04 NOTE — PROGRESS NOTES
Pt made aware of results after verifying name and . Labs all normal. States she has not had any dizziness today since she had her ear irrigated yesterday. If symptoms do persist I have ordered a CT of the brain and carotid artery ultrasound that she will schedule.  She was advised to go to ER for any syncope, SOB, or CP and to notify me if symptoms worsen

## 2021-06-23 ENCOUNTER — VIRTUAL VISIT (OUTPATIENT)
Dept: FAMILY MEDICINE CLINIC | Age: 51
End: 2021-06-23
Payer: COMMERCIAL

## 2021-06-23 DIAGNOSIS — H81.10 BENIGN PAROXYSMAL POSITIONAL VERTIGO, UNSPECIFIED LATERALITY: Primary | ICD-10-CM

## 2021-06-23 PROCEDURE — 99441 PR PHYS/QHP TELEPHONE EVALUATION 5-10 MIN: CPT | Performed by: NURSE PRACTITIONER

## 2021-06-23 RX ORDER — MECLIZINE HYDROCHLORIDE 25 MG/1
TABLET ORAL
COMMUNITY
Start: 2021-06-10 | End: 2021-07-02 | Stop reason: SDUPTHER

## 2021-06-23 NOTE — PROGRESS NOTES
Joanie Shaw is a 45 y.o. female evaluated via telephone on 6/23/2021. Consent:  She and/or health care decision maker is aware that that she may receive a bill for this telephone service, depending on her insurance coverage, and has provided verbal consent to proceed: Yes    CC: ER visit follow up     HPI:  Joanie Shaw is a 45 y.o. female who presents today to follow up for an ER visit. She went to the ER at [facility] in [address] on 6/10 with cc dizziness and headaches for the past 2-3 weeks. Labs and CT of head were normal including the sinuses. She was dx'd with vertigo and D/C'd home with Meclizine. It has helped. She is now taking it once a day prior to work and is wondering if she needs to take it every day or just as needed. She has not had another dizzy spell since. The headache has also improved and she denies any vision changes. Still drinking plenty to water daily. PLAN    Vertigo  Advised to take Meclizine only as needed after today since symptoms have resolved  F/U prn worsening headaches, vision changes, or other concerns      Documentation:  I communicated with the patient and/or health care decision maker about the plan of care as noted above. I affirm this is a Patient Initiated Episode with an Established Patient who has not had a related appointment within my department in the past 7 days or scheduled within the next 24 hours.     Total Time: minutes: 5-10 minutes    Note: not billable if this call serves to triage the patient into an appointment for the relevant concern      Wesley Finley NP

## 2021-07-02 NOTE — TELEPHONE ENCOUNTER
Patient requesting refill on     Requested Prescriptions     Pending Prescriptions Disp Refills    meclizine (ANTIVERT) 25 mg tablet       Sig: Take 1 tablet every 6 hr as needed for dizziness.          Last OV 6/23/2021

## 2021-07-04 RX ORDER — MECLIZINE HYDROCHLORIDE 25 MG/1
TABLET ORAL
Qty: 60 TABLET | Refills: 1 | Status: SHIPPED | OUTPATIENT
Start: 2021-07-04 | End: 2022-03-23 | Stop reason: SDUPTHER

## 2021-08-30 ENCOUNTER — HOSPITAL ENCOUNTER (EMERGENCY)
Age: 51
Discharge: HOME | End: 2021-08-30
Attending: EMERGENCY MEDICINE
Payer: COMMERCIAL

## 2021-08-30 ENCOUNTER — APPOINTMENT (OUTPATIENT)
Dept: GENERAL RADIOLOGY | Age: 51
End: 2021-08-30
Attending: EMERGENCY MEDICINE
Payer: COMMERCIAL

## 2021-08-30 VITALS
WEIGHT: 216 LBS | SYSTOLIC BLOOD PRESSURE: 137 MMHG | TEMPERATURE: 98.2 F | BODY MASS INDEX: 39.75 KG/M2 | DIASTOLIC BLOOD PRESSURE: 84 MMHG | HEART RATE: 74 BPM | OXYGEN SATURATION: 98 % | RESPIRATION RATE: 17 BRPM | HEIGHT: 62 IN

## 2021-08-30 DIAGNOSIS — R00.2 PALPITATIONS: Primary | ICD-10-CM

## 2021-08-30 DIAGNOSIS — K59.00 CONSTIPATION, UNSPECIFIED CONSTIPATION TYPE: ICD-10-CM

## 2021-08-30 LAB
ALBUMIN SERPL-MCNC: 3.9 G/DL (ref 3.5–5)
ALBUMIN/GLOB SERPL: 1 {RATIO} (ref 1.1–2.2)
ALP SERPL-CCNC: 80 U/L (ref 45–117)
ALT SERPL-CCNC: 30 U/L (ref 12–78)
ANION GAP SERPL CALC-SCNC: 7 MMOL/L (ref 5–15)
AST SERPL-CCNC: 19 U/L (ref 15–37)
ATRIAL RATE: 86 BPM
BASOPHILS # BLD: 0.1 K/UL (ref 0–0.1)
BASOPHILS NFR BLD: 1 % (ref 0–1)
BILIRUB SERPL-MCNC: 0.9 MG/DL (ref 0.2–1)
BUN SERPL-MCNC: 9 MG/DL (ref 6–20)
BUN/CREAT SERPL: 12 (ref 12–20)
CALCIUM SERPL-MCNC: 9.4 MG/DL (ref 8.5–10.1)
CALCULATED P AXIS, ECG09: 42 DEGREES
CALCULATED R AXIS, ECG10: 4 DEGREES
CALCULATED T AXIS, ECG11: 25 DEGREES
CHLORIDE SERPL-SCNC: 105 MMOL/L (ref 97–108)
CK MB CFR SERPL CALC: NORMAL % (ref 0–2.5)
CK MB SERPL-MCNC: <1 NG/ML (ref 5–25)
CK SERPL-CCNC: 113 U/L (ref 26–192)
CO2 SERPL-SCNC: 29 MMOL/L (ref 21–32)
COMMENT, HOLDF: NORMAL
CREAT SERPL-MCNC: 0.74 MG/DL (ref 0.55–1.02)
DIAGNOSIS, 93000: NORMAL
DIFFERENTIAL METHOD BLD: NORMAL
EOSINOPHIL # BLD: 0.4 K/UL (ref 0–0.4)
EOSINOPHIL NFR BLD: 6 % (ref 0–7)
ERYTHROCYTE [DISTWIDTH] IN BLOOD BY AUTOMATED COUNT: 12.3 % (ref 11.5–14.5)
GLOBULIN SER CALC-MCNC: 4.1 G/DL (ref 2–4)
GLUCOSE SERPL-MCNC: 105 MG/DL (ref 65–100)
HCT VFR BLD AUTO: 42.8 % (ref 35–47)
HGB BLD-MCNC: 14.5 G/DL (ref 11.5–16)
IMM GRANULOCYTES # BLD AUTO: 0 K/UL (ref 0–0.04)
IMM GRANULOCYTES NFR BLD AUTO: 0 % (ref 0–0.5)
LYMPHOCYTES # BLD: 2 K/UL (ref 0.8–3.5)
LYMPHOCYTES NFR BLD: 32 % (ref 12–49)
MAGNESIUM SERPL-MCNC: 2.3 MG/DL (ref 1.6–2.4)
MCH RBC QN AUTO: 29.4 PG (ref 26–34)
MCHC RBC AUTO-ENTMCNC: 33.9 G/DL (ref 30–36.5)
MCV RBC AUTO: 86.8 FL (ref 80–99)
MONOCYTES # BLD: 0.4 K/UL (ref 0–1)
MONOCYTES NFR BLD: 7 % (ref 5–13)
NEUTS SEG # BLD: 3.5 K/UL (ref 1.8–8)
NEUTS SEG NFR BLD: 54 % (ref 32–75)
NRBC # BLD: 0 K/UL (ref 0–0.01)
NRBC BLD-RTO: 0 PER 100 WBC
P-R INTERVAL, ECG05: 150 MS
PLATELET # BLD AUTO: 256 K/UL (ref 150–400)
PMV BLD AUTO: 10.3 FL (ref 8.9–12.9)
POTASSIUM SERPL-SCNC: 3.7 MMOL/L (ref 3.5–5.1)
PROT SERPL-MCNC: 8 G/DL (ref 6.4–8.2)
Q-T INTERVAL, ECG07: 374 MS
QRS DURATION, ECG06: 82 MS
QTC CALCULATION (BEZET), ECG08: 447 MS
RBC # BLD AUTO: 4.93 M/UL (ref 3.8–5.2)
SAMPLES BEING HELD,HOLD: NORMAL
SODIUM SERPL-SCNC: 141 MMOL/L (ref 136–145)
TROPONIN I SERPL-MCNC: <0.05 NG/ML
TROPONIN I SERPL-MCNC: <0.05 NG/ML
VENTRICULAR RATE, ECG03: 86 BPM
WBC # BLD AUTO: 6.3 K/UL (ref 3.6–11)

## 2021-08-30 PROCEDURE — 85025 COMPLETE CBC W/AUTO DIFF WBC: CPT

## 2021-08-30 PROCEDURE — 83735 ASSAY OF MAGNESIUM: CPT

## 2021-08-30 PROCEDURE — 94762 N-INVAS EAR/PLS OXIMTRY CONT: CPT

## 2021-08-30 PROCEDURE — 36415 COLL VENOUS BLD VENIPUNCTURE: CPT

## 2021-08-30 PROCEDURE — 93005 ELECTROCARDIOGRAM TRACING: CPT

## 2021-08-30 PROCEDURE — 71045 X-RAY EXAM CHEST 1 VIEW: CPT

## 2021-08-30 PROCEDURE — 82550 ASSAY OF CK (CPK): CPT

## 2021-08-30 PROCEDURE — 80053 COMPREHEN METABOLIC PANEL: CPT

## 2021-08-30 PROCEDURE — 74019 RADEX ABDOMEN 2 VIEWS: CPT

## 2021-08-30 PROCEDURE — 84484 ASSAY OF TROPONIN QUANT: CPT

## 2021-08-30 PROCEDURE — 99284 EMERGENCY DEPT VISIT MOD MDM: CPT

## 2021-08-30 NOTE — ED PROVIDER NOTES
EMERGENCY DEPARTMENT HISTORY AND PHYSICAL EXAM          Date: 8/30/2021  Patient Name: Syeda Farmer    History of Presenting Illness     Chief Complaint   Patient presents with    Chest Pain       History Provided By: Patient    HPI: Syeda Farmer is a 45 y.o. female, pmhx asthma, depression, who presents ambulatory to the ED c/o chest pain. Patient explains that she had her second Covid shot on Wednesday and she has been feeling fine until yesterday early day when she developed some chest fullness/bloating and palpitations. She notes that anytime she walks around and exerts herself she feels her heart racing and it makes her feel short of breath but she denies any difficulty breathing at baseline. She also denies any fevers, chills, coughing, nausea, vomiting and diarrhea as well as any leg pain or swelling. She states she has not eaten since early yesterday since she feels so bloated and swollen. Patient states this morning she took an Ativan around 8 AM to try to alleviate the symptoms but when she was not better she decided to come to the ER. She does note that she has been checking her heart rate and blood pressure at home and when she gets up and exerts herself her heart rate increases to 110. This morning before she came to the ER her blood pressure is 130/94. PCP: Wing Brandon NP    Allergies: None known  Social Hx: -tobacco, -vaping, -EtOH, -Illicit Drugs; There are no other complaints, changes, or physical findings at this time. Current Outpatient Medications   Medication Sig Dispense Refill    meclizine (ANTIVERT) 25 mg tablet Take 1 tablet every 6 hr as needed for dizziness. 60 Tablet 1    omeprazole (PRILOSEC) 20 mg capsule Take 1 capsule by mouth twice daily 180 Capsule 1    docosahexanoic acid/epa (FISH OIL PO) Take  by mouth.  multivitamin (ONE A DAY) tablet Take 1 Tab by mouth daily.          Past History     Past Medical History:  Past Medical History: Diagnosis Date    Asthma     Depression     Depression with anxiety 5/12/2015    GERD (gastroesophageal reflux disease)     Menopause        Past Surgical History:  Past Surgical History:   Procedure Laterality Date    HX GYN  1994    tubal ligation       Family History:  No family history on file. Social History:  Social History     Tobacco Use    Smoking status: Never Smoker    Smokeless tobacco: Never Used   Substance Use Topics    Alcohol use: No     Alcohol/week: 0.0 standard drinks    Drug use: Not on file       Allergies:  No Known Allergies      Review of Systems   Review of Systems   Constitutional: Negative for activity change, appetite change, chills, fever and unexpected weight change. HENT: Negative for congestion. Eyes: Negative for pain and visual disturbance. Respiratory: Negative for cough and shortness of breath. Cardiovascular: Positive for chest pain and palpitations. Negative for leg swelling. Gastrointestinal: Negative for abdominal pain, diarrhea, nausea and vomiting. Genitourinary: Negative for dysuria. Musculoskeletal: Negative for back pain. Skin: Negative for rash. Neurological: Negative for headaches. Physical Exam   Physical Exam  Vitals and nursing note reviewed. Constitutional:       Appearance: She is well-developed. She is not diaphoretic. Comments: Obese middle-aged female currently in minimal acute distress with slightly elevated blood pressure   HENT:      Head: Normocephalic and atraumatic. Eyes:      General:         Right eye: No discharge. Left eye: No discharge. Conjunctiva/sclera: Conjunctivae normal.      Pupils: Pupils are equal, round, and reactive to light. Cardiovascular:      Rate and Rhythm: Normal rate and regular rhythm. Heart sounds: Normal heart sounds. Heart sounds not distant. No murmur heard. No systolic murmur is present. No diastolic murmur is present. No friction rub. No gallop.  No S3 or S4 sounds. Pulmonary:      Effort: Pulmonary effort is normal. No tachypnea, accessory muscle usage or respiratory distress. Breath sounds: Normal breath sounds. No wheezing or rales. Abdominal:      General: Bowel sounds are normal. There is no distension. Palpations: Abdomen is soft. Tenderness: There is no abdominal tenderness. Musculoskeletal:         General: Normal range of motion. Cervical back: Normal range of motion and neck supple. Right lower leg: No tenderness. No edema. Left lower leg: No tenderness. No edema. Skin:     General: Skin is warm and dry. Findings: No rash. Neurological:      Mental Status: She is alert and oriented to person, place, and time. Cranial Nerves: No cranial nerve deficit. Motor: No abnormal muscle tone. Diagnostic Study Results     Labs -     Recent Results (from the past 12 hour(s))   CBC WITH AUTOMATED DIFF    Collection Time: 08/30/21  9:12 AM   Result Value Ref Range    WBC 6.3 3.6 - 11.0 K/uL    RBC 4.93 3.80 - 5.20 M/uL    HGB 14.5 11.5 - 16.0 g/dL    HCT 42.8 35.0 - 47.0 %    MCV 86.8 80.0 - 99.0 FL    MCH 29.4 26.0 - 34.0 PG    MCHC 33.9 30.0 - 36.5 g/dL    RDW 12.3 11.5 - 14.5 %    PLATELET 508 475 - 709 K/uL    MPV 10.3 8.9 - 12.9 FL    NRBC 0.0 0  WBC    ABSOLUTE NRBC 0.00 0.00 - 0.01 K/uL    NEUTROPHILS 54 32 - 75 %    LYMPHOCYTES 32 12 - 49 %    MONOCYTES 7 5 - 13 %    EOSINOPHILS 6 0 - 7 %    BASOPHILS 1 0 - 1 %    IMMATURE GRANULOCYTES 0 0.0 - 0.5 %    ABS. NEUTROPHILS 3.5 1.8 - 8.0 K/UL    ABS. LYMPHOCYTES 2.0 0.8 - 3.5 K/UL    ABS. MONOCYTES 0.4 0.0 - 1.0 K/UL    ABS. EOSINOPHILS 0.4 0.0 - 0.4 K/UL    ABS. BASOPHILS 0.1 0.0 - 0.1 K/UL    ABS. IMM.  GRANS. 0.0 0.00 - 0.04 K/UL    DF AUTOMATED     METABOLIC PANEL, COMPREHENSIVE    Collection Time: 08/30/21  9:12 AM   Result Value Ref Range    Sodium 141 136 - 145 mmol/L    Potassium 3.7 3.5 - 5.1 mmol/L    Chloride 105 97 - 108 mmol/L    CO2 29 21 - 32 mmol/L    Anion gap 7 5 - 15 mmol/L    Glucose 105 (H) 65 - 100 mg/dL    BUN 9 6 - 20 MG/DL    Creatinine 0.74 0.55 - 1.02 MG/DL    BUN/Creatinine ratio 12 12 - 20      GFR est AA >60 >60 ml/min/1.73m2    GFR est non-AA >60 >60 ml/min/1.73m2    Calcium 9.4 8.5 - 10.1 MG/DL    Bilirubin, total 0.9 0.2 - 1.0 MG/DL    ALT (SGPT) 30 12 - 78 U/L    AST (SGOT) 19 15 - 37 U/L    Alk. phosphatase 80 45 - 117 U/L    Protein, total 8.0 6.4 - 8.2 g/dL    Albumin 3.9 3.5 - 5.0 g/dL    Globulin 4.1 (H) 2.0 - 4.0 g/dL    A-G Ratio 1.0 (L) 1.1 - 2.2     MAGNESIUM    Collection Time: 08/30/21  9:12 AM   Result Value Ref Range    Magnesium 2.3 1.6 - 2.4 mg/dL   CK W/ CKMB & INDEX    Collection Time: 08/30/21  9:12 AM   Result Value Ref Range    CK - MB <1.0 <3.6 NG/ML    CK-MB Index Cannot be calculated 0.0 - 2.5       26 - 192 U/L   TROPONIN I    Collection Time: 08/30/21  9:12 AM   Result Value Ref Range    Troponin-I, Qt. <0.05 <0.05 ng/mL   SAMPLES BEING HELD    Collection Time: 08/30/21  9:12 AM   Result Value Ref Range    SAMPLES BEING HELD 1SST, 1RED, 1BLUE     COMMENT        Add-on orders for these samples will be processed based on acceptable specimen integrity and analyte stability, which may vary by analyte. EKG, 12 LEAD, INITIAL    Collection Time: 08/30/21  9:17 AM   Result Value Ref Range    Ventricular Rate 86 BPM    Atrial Rate 86 BPM    P-R Interval 150 ms    QRS Duration 82 ms    Q-T Interval 374 ms    QTC Calculation (Bezet) 447 ms    Calculated P Axis 42 degrees    Calculated R Axis 4 degrees    Calculated T Axis 25 degrees    Diagnosis       Normal sinus rhythm  Possible Left atrial enlargement  Borderline ECG  When compared with ECG of 15-JUL-2019 19:48,  No significant change was found         Radiologic Studies -   XR ABD FLAT/ ERECT   Final Result   Negative radiographic examination of the abdomen. XR CHEST PORT   Final Result   No acute process identified.             CT Results  (Last 48 hours)    None        CXR Results  (Last 48 hours)               08/30/21 0911  XR CHEST PORT Final result    Impression:  No acute process identified. Narrative:  Clinical history: cp   INDICATION:   cp   COMPARISON: None       FINDINGS:   AP portable upright view of the chest demonstrates a stable  cardiopericardial   silhouette. There is no pleural effusion. .There is no focal consolidation. .There   is no pneumothorax. .                    Medical Decision Making   I am the first provider for this patient. I reviewed the vital signs, available nursing notes, past medical history, past surgical history, family history and social history. Vital Signs-Reviewed the patient's vital signs. Patient Vitals for the past 12 hrs:   Temp Pulse Resp BP SpO2   08/30/21 1041  73 16 (!) 143/82 99 %   08/30/21 0958  83  (!) 150/91    08/30/21 0856 98.2 °F (36.8 °C) 94 16 (!) 154/104 98 %       Pulse Oximetry Analysis - 98% on RA    Cardiac Monitor:   Rate: 76bpm  Rhythm: Normal Sinus Rhythm      Records Reviewed: Nursing Notes, Old Medical Records, Previous electrocardiograms, Previous Radiology Studies and Previous Laboratory Studies    Provider Notes (Medical Decision Making):   MDM: Middle-aged female presenting with symptoms of palpitations currently with normal vital signs except slightly elevated blood pressure. Exam without any focal findings for infection. Lung exam clear without concern for pneumothorax. Patient PERC negative with low concern for PE. [facility] cardiac monitoring, electrolyte and enzyme evaluation. ED Course:   Initial assessment performed. The patients presenting problems have been discussed, and they are in agreement with the care plan formulated and outlined with them. I have encouraged them to ask questions as they arise throughout their visit. EKG interpretation: (Preliminary)  Rhythm: Sinus rhythm at a rate of 86 bpm; normal MD; normal QRS; normal QTC and normal axis.   [place] wave inversions noted in lead III. KG appears unchanged from 2019. This EKG was interpreted by ED Provider Nikos Rooney MD    PROGRESS NOTE:    ED Course as of Aug 30 1108   Mon Aug 30, 2021   1003 Patient resting comfortably without any acute abnormality. Request repeat vitals but labs reviewed without acute abnormality. Discussed results with patient. [JT]   1030 Patient on cardiac monitoring. Rhythm reviewed without any evidence of abnormality. Discussed results with patient and her heart rate was 76 until she started explaining that she was feeling nervous about the sensation of bloating and it jumped up to 96. Upon relaxation is back down into the mid 70s. Patient explains she has not been stooling well and has been using MiraLAX every week but has not had good result in last stool output was Saturday evening. Currently her abdominal exam is benign without any focal site of concern    [JT]      ED Course User Index  [JT] Mundo Le MD        Discharge note:  Pt re-evaluated and noted to be feeling better, ready for discharge. Updated pt on all final lab and x-ray findings. Will follow up as instructed. All questions have been answered, pt voiced understanding and agreement with plan. Specific return precautions provided as well as instructions to return to the ED should sx worsen at any time. Vital signs stable for discharge. Critical Care Time:   0      Diagnosis     Clinical Impression:   1. Palpitations    2. Constipation, unspecified constipation type        PLAN:  1. Current Discharge Medication List        2.    Follow-up Information     Follow up With Specialties Details Why Contact Owen Sanchez NP Nurse Practitioner Schedule an appointment as soon as possible for a visit   [address] [facility] [address] [facility].      [address] [facility] Emergency Medicine  If symptoms worsen [address] [phone]        Return to ED if worse     Disposition:  Home       Please note, this dictation was completed with [employer], the computer voice recognition software. Quite often unanticipated grammatical, syntax, homophones, and other interpretive errors are inadvertently transcribed by the computer software. Please disregard these errors. Please excuse any errors that have escaped final proof reading.

## 2021-08-30 NOTE — Clinical Note
[address] [place] [address] EMERGENCY DEP  [address] [place] [address] Dr Jose Perez [id number]  [phone]    Work/School Note    Date: 8/30/2021    To Whom It May concern:      Twyla Calvert was seen and treated today in the emergency room by the following provider(s):  Attending Provider: Awais Hernandez MD.      Twyla Calvert is excused from work/school on 08/30/21. She is clear to return to work/school on 08/31/21. Sincerely,          Renae Ibarra MD

## 2021-08-31 NOTE — PROGRESS NOTES
Subjective:     CC: ER visit follow up    Gilford Blackbird is a 45 y.o. female who presents today to follow up for an ER visit. She went to ER on 8-30-21 with c/o tachycardia, chest tightness, and bloating. Felt like her chest was \"full of air. \" BP was elevated at 154/100. She had received the Covid vaccine 5 days prior and believes this was an adverse reaction. Her labs including CMP, mag, and Troponin were benign. EKG showed NSR. CXR was normal. KUB was normal. She was discharged home and advised to follow up with PCP. Today she states she can still feel a little quivering in her chest and reports very mild SEQUEIRA. Rory Ray feels a little bloated in her chest.She denies any underlying anxiety or stress prior to her ER visit but since her symptoms started this has triggered some anxiety and so she has started taking some leftover Ativan 0.5mg daily as needed  (prescribed back in 2019). She has no hx of HTN. BP was originally elevated at 138/94 today but then came down to 122/90 when rechecked manually. Patient Active Problem List   Diagnosis Code    GERD (gastroesophageal reflux disease) K21.9    Asthma J45.909    Depression with anxiety F41.8    Anxiety F41.9    Obesity, morbid ([address] E66.01    BMI 40.0-44.9, adult ([facility] [address] Z68.41    Primary insomnia F05.35    Umbilical hernia D90.9    Fatty infiltration of liver K76.0    Enlarged uterus N85.2    Pure hypercholesterolemia E78.00    Benign paroxysmal positional vertigo H81.10       Past Medical History:   Diagnosis Date    Asthma     Depression     Depression with anxiety 5/12/2015    GERD (gastroesophageal reflux disease)     Menopause          Current Outpatient Medications:     meclizine (ANTIVERT) 25 mg tablet, Take 1 tablet every 6 hr as needed for dizziness. , Disp: 60 Tablet, Rfl: 1    omeprazole (PRILOSEC) 20 mg capsule, Take 1 capsule by mouth twice daily, Disp: 180 Capsule, Rfl: 1    docosahexanoic acid/epa (FISH OIL PO), Take by mouth., Disp: , Rfl:     multivitamin (ONE A DAY) tablet, Take 1 Tab by mouth daily. , Disp: , Rfl:     No Known Allergies    Past Surgical History:   Procedure Laterality Date    HX GYN  1994    tubal ligation       Social History     Tobacco Use   Smoking Status Never Smoker   Smokeless Tobacco Never Used       Social History     Socioeconomic History    Marital status:      Spouse name: Not on file    Number of children: Not on file    Years of education: Not on file    Highest education level: Not on file   Tobacco Use    Smoking status: Never Smoker    Smokeless tobacco: Never Used   Substance and Sexual Activity    Alcohol use: No     Alcohol/week: 0.0 standard drinks     Social Determinants of Health     Financial Resource Strain:     Difficulty of Paying Living Expenses:    Food Insecurity:     Worried About Running Out of Food in the Last Year:     [address] in the Last Year:    Transportation Needs:     Lack of Transportation (Medical):  Lack of Transportation (Non-Medical):    Physical Activity:     Days of Exercise per Week:     Minutes of Exercise per Session:    Stress:     Feeling of Stress :    Social Connections:     Frequency of Communication with Friends and Family:     Frequency of Social Gatherings with Friends and Family:     Attends Scientologist Services:     Active Member of Clubs or Organizations:     Attends Club or Organization Meetings:     Marital Status:        No family history on file.     ROS:  Gen: denies fever, chills, or fatigue  HEENT:denies H/A, nasal congestion, or sore throat  Resp: +mild SEQUEIRA, denies dyspnea, cough, or wheezing  CV: +sensation of chest fluttering and bloating denies chest pain, pressure, or palpitations  Extremeties: denies edema  GI[de-identified] denies abdominal pain, nausea, vomiting, diarrhea, or constipation  Musculoskeletal: no joint pain, stiffness, or muscle cramps  Neuro: denies numbness/tingling or dizziness  Skin: denies rashes or new lesions   Psych: denies anxiety    Objective:     Visit Vitals  BP (!) 122/90   Pulse 86   Temp 98.7 °F (37.1 °C)   Resp 20   Ht 5' 2\" (1.575 m)   Wt 216 lb 9.6 oz (98.2 kg)   SpO2 98%   BMI 39.62 kg/m²       General: Alert and oriented. No acute distress. Well nourished. HEENT :  Eyes: Sclera white, conjunctiva clear. PERRLA. Extra ocular movements intact. Neck: Supple with FROM. Lungs: Breathing even and unlabored. All lobes clear to auscultation bilaterally   Heart :RRR, S1 and S2 normal intensity, no extra heart sounds  Extremities: Non-edematous. Neuro: Cranial nerves grossly normal.  Psych: Mood and thought content appropriate for situation. Dressed appropriately and with good hygiene. Skin: Warm, dry, and intact. No lesions or discoloration. Results for orders placed or performed during the hospital encounter of 08/30/21   CBC WITH AUTOMATED DIFF   Result Value Ref Range    WBC 6.3 3.6 - 11.0 K/uL    RBC 4.93 3.80 - 5.20 M/uL    HGB 14.5 11.5 - 16.0 g/dL    HCT 42.8 35.0 - 47.0 %    MCV 86.8 80.0 - 99.0 FL    MCH 29.4 26.0 - 34.0 PG    MCHC 33.9 30.0 - 36.5 g/dL    RDW 12.3 11.5 - 14.5 %    PLATELET 977 459 - 432 K/uL    MPV 10.3 8.9 - 12.9 FL    NRBC 0.0 0  WBC    ABSOLUTE NRBC 0.00 0.00 - 0.01 K/uL    NEUTROPHILS 54 32 - 75 %    LYMPHOCYTES 32 12 - 49 %    MONOCYTES 7 5 - 13 %    EOSINOPHILS 6 0 - 7 %    BASOPHILS 1 0 - 1 %    IMMATURE GRANULOCYTES 0 0.0 - 0.5 %    ABS. NEUTROPHILS 3.5 1.8 - 8.0 K/UL    ABS. LYMPHOCYTES 2.0 0.8 - 3.5 K/UL    ABS. MONOCYTES 0.4 0.0 - 1.0 K/UL    ABS. EOSINOPHILS 0.4 0.0 - 0.4 K/UL    ABS. BASOPHILS 0.1 0.0 - 0.1 K/UL    ABS. IMM.  GRANS. 0.0 0.00 - 0.04 K/UL    DF AUTOMATED     METABOLIC PANEL, COMPREHENSIVE   Result Value Ref Range    Sodium 141 136 - 145 mmol/L    Potassium 3.7 3.5 - 5.1 mmol/L    Chloride 105 97 - 108 mmol/L    CO2 29 21 - 32 mmol/L    Anion gap 7 5 - 15 mmol/L    Glucose 105 (H) 65 - 100 mg/dL    BUN 9 6 - 20 MG/DL Creatinine 0.74 0.55 - 1.02 MG/DL    BUN/Creatinine ratio 12 12 - 20      GFR est AA >60 >60 ml/min/1.73m2    GFR est non-AA >60 >60 ml/min/1.73m2    Calcium 9.4 8.5 - 10.1 MG/DL    Bilirubin, total 0.9 0.2 - 1.0 MG/DL    ALT (SGPT) 30 12 - 78 U/L    AST (SGOT) 19 15 - 37 U/L    Alk. phosphatase 80 45 - 117 U/L    Protein, total 8.0 6.4 - 8.2 g/dL    Albumin 3.9 3.5 - 5.0 g/dL    Globulin 4.1 (H) 2.0 - 4.0 g/dL    A-G Ratio 1.0 (L) 1.1 - 2.2     MAGNESIUM   Result Value Ref Range    Magnesium 2.3 1.6 - 2.4 mg/dL   CK W/ CKMB & INDEX   Result Value Ref Range    CK - MB <1.0 <3.6 NG/ML    CK-MB Index Cannot be calculated 0.0 - 2.5       26 - 192 U/L   TROPONIN I   Result Value Ref Range    Troponin-I, Qt. <0.05 <0.05 ng/mL   SAMPLES BEING HELD   Result Value Ref Range    SAMPLES BEING HELD 1SST, 1RED, 1BLUE     COMMENT        Add-on orders for these samples will be processed based on acceptable specimen integrity and analyte stability, which may vary by analyte.    TROPONIN I   Result Value Ref Range    Troponin-I, Qt. <0.05 <0.05 ng/mL   EKG, 12 LEAD, INITIAL   Result Value Ref Range    Ventricular Rate 86 BPM    Atrial Rate 86 BPM    P-R Interval 150 ms    QRS Duration 82 ms    Q-T Interval 374 ms    QTC Calculation (Bezet) 447 ms    Calculated P Axis 42 degrees    Calculated R Axis 4 degrees    Calculated T Axis 25 degrees    Diagnosis       Normal sinus rhythm  Possible Left atrial enlargement  Borderline ECG  When compared with ECG of 15-JUL-2019 19:48,  No significant change was found  Confirmed by Greyson Hays MD, --- ([id number]) on 8/30/2021 7:32:37 PM         Assessment/ Plan:     Sensation of chest fluttering and bloating  She strongly believes her symptoms are related to the Covid vaccine she received 2 weeks ago  Suggested wearing a Holtor monitor to r/o arrythmias but she declines  She will cont to monitor her symptoms and let me know if they worsen or go back to ER if they are severe  Consider checking D dimer if symptoms worsen  F/U 1 week    Elevated BP without hx of HTN  She strongly believes her symptoms are related to the Covid vaccine she received 2 weeks ago  Suggested starting a very low dose of beta blocker to lower BP and possibly improve fluttering sensation but she declines  She will cont to monitor her BP at home or at work   F/U 1 week      Verbal and written instructions (see AVS) provided.  Patient expresses understanding of diagnosis and treatment plan. Health Maintenance Due   Topic Date Due    Hepatitis C Screening  Never done    COVID-19 Vaccine (1) Never done    Shingrix Vaccine Age 50> (1 of 2) Never done               Fabiana Samples D.  Bob Lamar, MARISELA

## 2021-09-01 ENCOUNTER — OFFICE VISIT (OUTPATIENT)
Dept: FAMILY MEDICINE CLINIC | Age: 51
End: 2021-09-01
Payer: COMMERCIAL

## 2021-09-01 VITALS
BODY MASS INDEX: 39.86 KG/M2 | HEART RATE: 86 BPM | SYSTOLIC BLOOD PRESSURE: 122 MMHG | HEIGHT: 62 IN | DIASTOLIC BLOOD PRESSURE: 90 MMHG | TEMPERATURE: 98.7 F | WEIGHT: 216.6 LBS | RESPIRATION RATE: 20 BRPM | OXYGEN SATURATION: 98 %

## 2021-09-01 DIAGNOSIS — R00.2 FLUTTERING SENSATION OF HEART: Primary | ICD-10-CM

## 2021-09-01 DIAGNOSIS — R06.09 DOE (DYSPNEA ON EXERTION): ICD-10-CM

## 2021-09-01 DIAGNOSIS — R03.0 BLOOD PRESSURE ELEVATED WITHOUT HISTORY OF HTN: ICD-10-CM

## 2021-09-01 PROCEDURE — 99213 OFFICE O/P EST LOW 20 MIN: CPT | Performed by: NURSE PRACTITIONER

## 2021-09-01 RX ORDER — LORAZEPAM 0.5 MG/1
TABLET ORAL
COMMUNITY
End: 2021-09-03 | Stop reason: SDUPTHER

## 2021-09-01 NOTE — PROGRESS NOTES
Ava Ding is a 45 y.o. female evaluated via telephone on 9/8/2021. Consent:  She and/or health care decision maker is aware that that she may receive a bill for this telephone service, depending on her insurance coverage, and has provided verbal consent to proceed: Yes    CC: chest fluttering, elevated BP    HPI:  Ava Ding is a 45 y.o. female who presents today for a 1 week follow up for sensation of chest fluttering and elevated BP.    4-5 days after receiving her second Covid vaccine, she went to ER on 8-30-21 with c/o tachycardia, chest tightness, and bloating. Felt like her chest was \"full of air. \" BP was elevated at 154/100. She had received the Covid vaccine 5 days prior and believes this was an adverse reaction. Her labs including CMP, mag, and Troponin were benign. EKG showed NSR. CXR was normal. KUB was normal. She was discharged home and advised to follow up with PCP. At her follow up appt here 2 days later she stated she could still feel a little quivering in her chest and reported very mild SEQUEIRA. Still felt a little bloated in her chest. Denied any underlying anxiety or stress prior to her ER visit. Her symptoms did cause her to be anxious after her ER visit so she had re-started taking some leftover Ativan 0.5mg daily as needed  (prescribed back in 2019). DBP was a little elevated in the 90's. We had discussed possibly starting a low dose of beta blocker or getting a 24 hour Holter monitor but she had declined and wanted to cont to monitor her symptoms. Today she states her BP this morning is 125/93, however over the past week she has checked her BP daily and gotten the following readings: 130/84, 120/88, 115/80, 117/82, 126/85, 128/79. She states she is no longer having any fluttering in her chest but she did have to go back to the ER 4 days ago with c/o palpitations and chest \"bloating. \" She also reported diarrhea.  She was told her symptoms were related to GERD and IBS. She was given diet instructions and prescribed Bentyl as needed and this has helped reduce the gas and pressure in her chest. However she has not taken it more than once because she felt it \"dried her out. \"    She is on Omeprazole 20mg BID for GERD. She avoids spicy and citrous foods. Eats small meals throughout the day. Saw CYADEN Jauregui a couple of years ago and had EGD and colonoscopy. She is still using Ativan 0.5mg daily prn anxiety. She got very nervous yesterday and had to take it. We discussed the importance of starting an SSRI to reduce anxiety and IBS symptoms as these are the first line drugs for anxiety. She has not wanted to take SSRI's in the past due to fear of weight gain. She has tried Wellbutrin in the past but could not tolerate it. Assessment/ Plan:     GERD  Increase Omeprazole to 20mg BID  Avoid spicy and citreus foods, caffeine, and alcohol. Eat smaller, more frequent meals. Do not lay down to go to sleep for at least 2 hours after eating. Need to reduce anxiety/ stress  F/U 4 weeks, if no improvement, will send back to Dr Amado Jauregui    Elevated BP without hx of HTN  BP at home at goal for the past week  Cont to monitor     IBS  Educated on importance of reducing anxiety which will improve IBS symptoms  Cont IBS diet  Cont Bentyl prn diarrhea/ gas  F/U 4 weeks    Anxiety  Pt informed the SSRI's are the first line drugs in treating anxiety as opposed to Ativan. She does not want to take anything that will cause her to gain weight   Try Buspar 5mg- take 1-2 tabs TID prn anxiety  F/U 4 weeks      Documentation:  I communicated with the patient and/or health care decision maker about the plan of care as noted above. I affirm this is a Patient Initiated Episode with an Established Patient who has not had a related appointment within my department in the past 7 days or scheduled within the next 24 hours.     Total Time: minutes: 11-20 minutes    Note: not billable if this call serves to triage the patient into an appointment for the relevant concern      Crow Hughes NP

## 2021-09-03 DIAGNOSIS — F41.9 ANXIETY: Primary | ICD-10-CM

## 2021-09-03 NOTE — TELEPHONE ENCOUNTER
Patient requesting refill on     Requested Prescriptions     Pending Prescriptions Disp Refills    LORazepam (Ativan) 0.5 mg tablet       Sig: Take  by mouth.          Last OV 9/1/2021

## 2021-09-04 RX ORDER — LORAZEPAM 0.5 MG/1
TABLET ORAL
Qty: 15 TABLET | Refills: 0 | Status: SHIPPED | OUTPATIENT
Start: 2021-09-04 | End: 2022-02-09

## 2021-09-08 ENCOUNTER — VIRTUAL VISIT (OUTPATIENT)
Dept: FAMILY MEDICINE CLINIC | Age: 51
End: 2021-09-08
Payer: COMMERCIAL

## 2021-09-08 DIAGNOSIS — K21.9 GASTROESOPHAGEAL REFLUX DISEASE: ICD-10-CM

## 2021-09-08 DIAGNOSIS — R03.0 ELEVATED BP WITHOUT DIAGNOSIS OF HYPERTENSION: ICD-10-CM

## 2021-09-08 DIAGNOSIS — K21.9 GASTROESOPHAGEAL REFLUX DISEASE, UNSPECIFIED WHETHER ESOPHAGITIS PRESENT: Primary | ICD-10-CM

## 2021-09-08 DIAGNOSIS — F41.9 ANXIETY: ICD-10-CM

## 2021-09-08 DIAGNOSIS — K58.0 IRRITABLE BOWEL SYNDROME WITH DIARRHEA: ICD-10-CM

## 2021-09-08 PROCEDURE — 99442 PR PHYS/QHP TELEPHONE EVALUATION 11-20 MIN: CPT | Performed by: NURSE PRACTITIONER

## 2021-09-08 RX ORDER — BUSPIRONE HYDROCHLORIDE 5 MG/1
5-10 TABLET ORAL
Qty: 60 TABLET | Refills: 0 | Status: SHIPPED | OUTPATIENT
Start: 2021-09-08 | End: 2021-09-14 | Stop reason: SINTOL

## 2021-09-08 RX ORDER — DICYCLOMINE HYDROCHLORIDE 20 MG/1
TABLET ORAL
COMMUNITY
Start: 2021-09-04 | End: 2022-09-09

## 2021-09-08 RX ORDER — OMEPRAZOLE 40 MG/1
40 CAPSULE, DELAYED RELEASE ORAL 2 TIMES DAILY
Qty: 180 CAPSULE | Refills: 0 | Status: SHIPPED | OUTPATIENT
Start: 2021-09-08 | End: 2021-12-29

## 2021-09-08 NOTE — PROGRESS NOTES
1. Have you been to the ER, urgent care clinic since your last visit? Hospitalized since your last visit? ORLANDO muller 9/3/21 for chest tightness    2. Have you seen or consulted any other health care providers outside of the [address] [place] [address] since your last visit? Include any pap smears or colon screening.  No     Chief Complaint   Patient presents with    Hypertension    GERD     Patient-Reported Vitals 9/8/2021   Patient-Reported Weight -   Patient-Reported Pulse 92   Patient-Reported SpO2 98   Patient-Reported Systolic  752   Patient-Reported Diastolic 93

## 2021-09-10 ENCOUNTER — APPOINTMENT (OUTPATIENT)
Dept: CT IMAGING | Age: 51
End: 2021-09-10
Attending: EMERGENCY MEDICINE
Payer: COMMERCIAL

## 2021-09-10 ENCOUNTER — HOSPITAL ENCOUNTER (EMERGENCY)
Age: 51
Discharge: HOME | End: 2021-09-10
Attending: EMERGENCY MEDICINE
Payer: COMMERCIAL

## 2021-09-10 DIAGNOSIS — R10.13 DYSPEPSIA: ICD-10-CM

## 2021-09-10 DIAGNOSIS — R07.89 ATYPICAL CHEST PAIN: Primary | ICD-10-CM

## 2021-09-10 LAB
ALBUMIN SERPL-MCNC: 4.1 G/DL (ref 3.5–5)
ALBUMIN/GLOB SERPL: 1 {RATIO} (ref 1.1–2.2)
ALP SERPL-CCNC: 70 U/L (ref 45–117)
ALT SERPL-CCNC: 53 U/L (ref 12–78)
ANION GAP SERPL CALC-SCNC: 13 MMOL/L (ref 5–15)
AST SERPL-CCNC: 34 U/L (ref 15–37)
BASOPHILS # BLD: 0 K/UL (ref 0–0.1)
BASOPHILS NFR BLD: 0 % (ref 0–1)
BILIRUB SERPL-MCNC: 0.8 MG/DL (ref 0.2–1)
BUN SERPL-MCNC: 13 MG/DL (ref 6–20)
BUN/CREAT SERPL: 15 (ref 12–20)
CALCIUM SERPL-MCNC: 9.3 MG/DL (ref 8.5–10.1)
CHLORIDE SERPL-SCNC: 101 MMOL/L (ref 97–108)
CO2 SERPL-SCNC: 25 MMOL/L (ref 21–32)
COMMENT, HOLDF: NORMAL
CREAT SERPL-MCNC: 0.84 MG/DL (ref 0.55–1.02)
DIFFERENTIAL METHOD BLD: NORMAL
EOSINOPHIL # BLD: 0.2 K/UL (ref 0–0.4)
EOSINOPHIL NFR BLD: 2 % (ref 0–7)
ERYTHROCYTE [DISTWIDTH] IN BLOOD BY AUTOMATED COUNT: 11.9 % (ref 11.5–14.5)
GLOBULIN SER CALC-MCNC: 4.2 G/DL (ref 2–4)
GLUCOSE SERPL-MCNC: 94 MG/DL (ref 65–100)
HCT VFR BLD AUTO: 41.3 % (ref 35–47)
HGB BLD-MCNC: 14.3 G/DL (ref 11.5–16)
IMM GRANULOCYTES # BLD AUTO: 0 K/UL (ref 0–0.04)
IMM GRANULOCYTES NFR BLD AUTO: 0 % (ref 0–0.5)
LIPASE SERPL-CCNC: 106 U/L (ref 73–393)
LYMPHOCYTES # BLD: 1.9 K/UL (ref 0.8–3.5)
LYMPHOCYTES NFR BLD: 24 % (ref 12–49)
MAGNESIUM SERPL-MCNC: 2.1 MG/DL (ref 1.6–2.4)
MCH RBC QN AUTO: 29.4 PG (ref 26–34)
MCHC RBC AUTO-ENTMCNC: 34.6 G/DL (ref 30–36.5)
MCV RBC AUTO: 85 FL (ref 80–99)
MONOCYTES # BLD: 0.6 K/UL (ref 0–1)
MONOCYTES NFR BLD: 8 % (ref 5–13)
NEUTS SEG # BLD: 5.2 K/UL (ref 1.8–8)
NEUTS SEG NFR BLD: 66 % (ref 32–75)
NRBC # BLD: 0 K/UL (ref 0–0.01)
NRBC BLD-RTO: 0 PER 100 WBC
PLATELET # BLD AUTO: 254 K/UL (ref 150–400)
PMV BLD AUTO: 10.3 FL (ref 8.9–12.9)
POTASSIUM SERPL-SCNC: 3.8 MMOL/L (ref 3.5–5.1)
PROT SERPL-MCNC: 8.3 G/DL (ref 6.4–8.2)
RBC # BLD AUTO: 4.86 M/UL (ref 3.8–5.2)
SAMPLES BEING HELD,HOLD: NORMAL
SODIUM SERPL-SCNC: 139 MMOL/L (ref 136–145)
WBC # BLD AUTO: 7.9 K/UL (ref 3.6–11)

## 2021-09-10 PROCEDURE — 74177 CT ABD & PELVIS W/CONTRAST: CPT

## 2021-09-10 PROCEDURE — 83690 ASSAY OF LIPASE: CPT

## 2021-09-10 PROCEDURE — 74011000636 HC RX REV CODE- 636: Performed by: EMERGENCY MEDICINE

## 2021-09-10 PROCEDURE — 96374 THER/PROPH/DIAG INJ IV PUSH: CPT

## 2021-09-10 PROCEDURE — 99284 EMERGENCY DEPT VISIT MOD MDM: CPT

## 2021-09-10 PROCEDURE — 36415 COLL VENOUS BLD VENIPUNCTURE: CPT

## 2021-09-10 PROCEDURE — 74011250636 HC RX REV CODE- 250/636: Performed by: EMERGENCY MEDICINE

## 2021-09-10 PROCEDURE — 83735 ASSAY OF MAGNESIUM: CPT

## 2021-09-10 PROCEDURE — 93005 ELECTROCARDIOGRAM TRACING: CPT

## 2021-09-10 PROCEDURE — 96375 TX/PRO/DX INJ NEW DRUG ADDON: CPT

## 2021-09-10 PROCEDURE — 80053 COMPREHEN METABOLIC PANEL: CPT

## 2021-09-10 PROCEDURE — 85025 COMPLETE CBC W/AUTO DIFF WBC: CPT

## 2021-09-10 RX ORDER — LORAZEPAM 2 MG/ML
0.5 INJECTION INTRAMUSCULAR
Status: COMPLETED | OUTPATIENT
Start: 2021-09-10 | End: 2021-09-10

## 2021-09-10 RX ORDER — MORPHINE SULFATE 4 MG/ML
4 INJECTION INTRAVENOUS ONCE
Status: COMPLETED | OUTPATIENT
Start: 2021-09-10 | End: 2021-09-10

## 2021-09-10 RX ORDER — ONDANSETRON 2 MG/ML
8 INJECTION INTRAMUSCULAR; INTRAVENOUS
Status: COMPLETED | OUTPATIENT
Start: 2021-09-10 | End: 2021-09-10

## 2021-09-10 RX ADMIN — ONDANSETRON 8 MG: 2 INJECTION INTRAMUSCULAR; INTRAVENOUS at 14:07

## 2021-09-10 RX ADMIN — LORAZEPAM 0.5 MG: 2 INJECTION INTRAMUSCULAR; INTRAVENOUS at 14:07

## 2021-09-10 RX ADMIN — MORPHINE SULFATE 4 MG: 4 INJECTION INTRAVENOUS at 14:07

## 2021-09-10 RX ADMIN — IOPAMIDOL 100 ML: 612 INJECTION, SOLUTION INTRAVENOUS at 14:24

## 2021-09-10 NOTE — ED PROVIDER NOTES
EMERGENCY DEPARTMENT HISTORY AND PHYSICAL EXAM          Date: 9/10/2021  Patient Name: Gilford Blackbird    History of Presenting Illness     Chief Complaint   Patient presents with    Chest Pain       History Provided By: Patient    HPI: Gilford Blackbird is a 45 y.o. female, pmhx asthma, depression, anxiety, GERD, who presents ambulatory chest pain to the ED c/o chest pain    Patient explains she has been having intermittent chest pain for 2 weeks. She notes it is associated with nausea but she has not had any vomiting or diarrhea. Whenever she eats it makes the food worse. She has been seen in ER x3 and by primary care. When she was at her primary care doctor's office 2 days ago for her follow-up, she was started on BuSpar for her worsening anxiety but explains that she did not go  the medications. She states she tried to eat yogurt today and that about an hour later the pain was severe and she got really upset so she took an Ativan and that did not help so she decided to come to the ER for evaluation. Currently her chest pain is 7-8 out of 10. Patient specifically denies any recent fevers, chills, vomiting, diarrhea, SOB, urinary sxs, changes in BM, or headache. PCP: Thao Lira NP    Allergies: None known    There are no other complaints, changes, or physical findings at this time. Current Outpatient Medications   Medication Sig Dispense Refill    dicyclomine (BENTYL) 20 mg tablet TAKE 1 TABLET BY MOUTH EVERY 6 HOURS AS NEEDED FOR ABDOMINAL DISCOMFORT      busPIRone (BUSPAR) 5 mg tablet Take 1-2 Tablets by mouth three (3) times daily as needed (anxiety). 60 Tablet 0    omeprazole (PRILOSEC) 40 mg capsule Take 1 Capsule by mouth two (2) times a day. 180 Capsule 0    LORazepam (Ativan) 0.5 mg tablet Take  by mouth. 15 Tablet 0    meclizine (ANTIVERT) 25 mg tablet Take 1 tablet every 6 hr as needed for dizziness.  60 Tablet 1    docosahexanoic acid/epa (FISH OIL PO) Take  by mouth.      multivitamin (ONE A DAY) tablet Take 1 Tab by mouth daily. Past History     Past Medical History:  Past Medical History:   Diagnosis Date    Asthma     Depression     Depression with anxiety 5/12/2015    GERD (gastroesophageal reflux disease)     Menopause        Past Surgical History:  Past Surgical History:   Procedure Laterality Date    HX GYN  1994    tubal ligation       Family History:  No family history on file. Social History:  Social History     Tobacco Use    Smoking status: Never Smoker    Smokeless tobacco: Never Used   Substance Use Topics    Alcohol use: No     Alcohol/week: 0.0 standard drinks    Drug use: Not on file       Allergies:  No Known Allergies      Review of Systems   Review of Systems   Constitutional: Positive for unexpected weight change (She states she cannot eat because of bloating and pain). Negative for activity change, appetite change, chills and fever. HENT: Negative for congestion. Eyes: Negative for pain and visual disturbance. Respiratory: Negative for cough and shortness of breath. Cardiovascular: Positive for chest pain. Gastrointestinal: Positive for abdominal distention (She states she feels bloated), constipation and nausea. Negative for abdominal pain, diarrhea and vomiting. Genitourinary: Negative for dysuria. Musculoskeletal: Negative for back pain. Skin: Negative for rash. Neurological: Negative for headaches. Physical Exam   Physical Exam  Vitals and nursing note reviewed. Constitutional:       Appearance: She is well-developed. She is not diaphoretic. Comments: Is an extremely anxious middle-aged female sitting in triage with elevated blood pressure 180/90 and mildly elevated heart rate, currently in mild distress   HENT:      Head: Normocephalic and atraumatic. Eyes:      General:         Right eye: No discharge. Left eye: No discharge.       Conjunctiva/sclera: Conjunctivae normal. Pupils: Pupils are equal, round, and reactive to light. Cardiovascular:      Rate and Rhythm: Regular rhythm. Tachycardia present. Heart sounds: Normal heart sounds. No murmur heard. Pulmonary:      Effort: Pulmonary effort is normal. No respiratory distress. Breath sounds: Normal breath sounds. No decreased breath sounds, wheezing or rales. Abdominal:      General: Bowel sounds are normal. There is no distension. Palpations: Abdomen is soft. Tenderness: There is no abdominal tenderness. Musculoskeletal:         General: Normal range of motion. Cervical back: Normal range of motion and neck supple. Skin:     General: Skin is warm and dry. Findings: No rash. Neurological:      Mental Status: She is alert and oriented to person, place, and time. Cranial Nerves: No cranial nerve deficit. Motor: No abnormal muscle tone. Diagnostic Study Results     Labs -     Recent Results (from the past 12 hour(s))   CBC WITH AUTOMATED DIFF    Collection Time: 09/10/21  1:38 PM   Result Value Ref Range    WBC 7.9 3.6 - 11.0 K/uL    RBC 4.86 3.80 - 5.20 M/uL    HGB 14.3 11.5 - 16.0 g/dL    HCT 41.3 35.0 - 47.0 %    MCV 85.0 80.0 - 99.0 FL    MCH 29.4 26.0 - 34.0 PG    MCHC 34.6 30.0 - 36.5 g/dL    RDW 11.9 11.5 - 14.5 %    PLATELET 014 048 - 480 K/uL    MPV 10.3 8.9 - 12.9 FL    NRBC 0.0 0  WBC    ABSOLUTE NRBC 0.00 0.00 - 0.01 K/uL    NEUTROPHILS 66 32 - 75 %    LYMPHOCYTES 24 12 - 49 %    MONOCYTES 8 5 - 13 %    EOSINOPHILS 2 0 - 7 %    BASOPHILS 0 0 - 1 %    IMMATURE GRANULOCYTES 0 0.0 - 0.5 %    ABS. NEUTROPHILS 5.2 1.8 - 8.0 K/UL    ABS. LYMPHOCYTES 1.9 0.8 - 3.5 K/UL    ABS. MONOCYTES 0.6 0.0 - 1.0 K/UL    ABS. EOSINOPHILS 0.2 0.0 - 0.4 K/UL    ABS. BASOPHILS 0.0 0.0 - 0.1 K/UL    ABS. IMM.  GRANS. 0.0 0.00 - 0.04 K/UL    DF AUTOMATED     METABOLIC PANEL, COMPREHENSIVE    Collection Time: 09/10/21  1:38 PM   Result Value Ref Range    Sodium 139 136 - 145 mmol/L Potassium 3.8 3.5 - 5.1 mmol/L    Chloride 101 97 - 108 mmol/L    CO2 25 21 - 32 mmol/L    Anion gap 13 5 - 15 mmol/L    Glucose 94 65 - 100 mg/dL    BUN 13 6 - 20 MG/DL    Creatinine 0.84 0.55 - 1.02 MG/DL    BUN/Creatinine ratio 15 12 - 20      GFR est AA >60 >60 ml/min/1.73m2    GFR est non-AA >60 >60 ml/min/1.73m2    Calcium 9.3 8.5 - 10.1 MG/DL    Bilirubin, total 0.8 0.2 - 1.0 MG/DL    ALT (SGPT) 53 12 - 78 U/L    AST (SGOT) 34 15 - 37 U/L    Alk. phosphatase 70 45 - 117 U/L    Protein, total 8.3 (H) 6.4 - 8.2 g/dL    Albumin 4.1 3.5 - 5.0 g/dL    Globulin 4.2 (H) 2.0 - 4.0 g/dL    A-G Ratio 1.0 (L) 1.1 - 2.2     LIPASE    Collection Time: 09/10/21  1:38 PM   Result Value Ref Range    Lipase 106 73 - 393 U/L   MAGNESIUM    Collection Time: 09/10/21  1:38 PM   Result Value Ref Range    Magnesium 2.1 1.6 - 2.4 mg/dL   SAMPLES BEING HELD    Collection Time: 09/10/21  1:38 PM   Result Value Ref Range    SAMPLES BEING HELD 1SST, 1BLUE     COMMENT        Add-on orders for these samples will be processed based on acceptable specimen integrity and analyte stability, which may vary by analyte.    EKG, 12 LEAD, INITIAL    Collection Time: 09/10/21  1:51 PM   Result Value Ref Range    Ventricular Rate 90 BPM    Atrial Rate 90 BPM    P-R Interval 156 ms    QRS Duration 78 ms    Q-T Interval 356 ms    QTC Calculation (Bezet) 435 ms    Calculated P Axis 59 degrees    Calculated R Axis 8 degrees    Calculated T Axis 30 degrees    Diagnosis       Normal sinus rhythm with sinus arrhythmia  Normal ECG  When compared with ECG of 30-AUG-2021 09:17,  No significant change was found         Radiologic Studies -   CT ABD PELV W CONT   Final Result   Hepatic steatosis        CT Results  (Last 48 hours)               09/10/21 1424  CT ABD PELV W CONT Final result    Impression:  Hepatic steatosis       Narrative:  EXAM: CT ABD PELV W CONT       INDICATION: Abdominal pain, chest pain, bloating       COMPARISON: 6/26/2019 CONTRAST: 100 mL of Isovue-370. TECHNIQUE:    Following the uneventful intravenous administration of contrast, thin axial   images were obtained through the abdomen and pelvis. Coronal and sagittal   reconstructions were generated. Oral contrast was not administered. CT dose   reduction was achieved through use of a standardized protocol tailored for this   examination and automatic exposure control for dose modulation. FINDINGS:    LOWER THORAX: No significant abnormality in the incidentally imaged lower chest.   LIVER: No mass. Mild hypodensity in the liver diffusely. More conspicuous   triangular hypodensity along the falciform ligament of the liver, compatible   with focal fatty infiltration. Anshul Meka BILIARY TREE: Gallbladder is within normal limits. CBD is not dilated. SPLEEN: within normal limits. PANCREAS: No mass or ductal dilatation. ADRENALS: Unremarkable. KIDNEYS: No mass, calculus, or hydronephrosis. STOMACH: Unremarkable. SMALL BOWEL: No dilatation or wall thickening. COLON: No dilatation or wall thickening. APPENDIX: Axial image 48   PERITONEUM: No ascites or pneumoperitoneum. RETROPERITONEUM: No lymphadenopathy or aortic aneurysm. REPRODUCTIVE ORGANS: Anteverted uterus on axial image 64   URINARY BLADDER: No mass or calculus. BONES: No destructive bone lesion. ABDOMINAL WALL: No mass or hernia. ADDITIONAL COMMENTS: N/A               CXR Results  (Last 48 hours)    None            Medical Decision Making   I am the first provider for this patient. I reviewed the vital signs, available nursing notes, past medical history, past surgical history, family history and social history.     Vital Signs- Blood pressure 160/70 with heart rate of 103    Pulse Oximetry Analysis - 100% on RA    Records Reviewed: Nursing Notes, Old Medical Records, Previous Radiology Studies and Previous Laboratory Studies    Provider Notes (Medical Decision Making):   MDM: Middle-aged female presents for the fourth ER visit due to epigastric pain which she is calling chest pain. It sounds like this is all GERD related. On review of all her prior visits she is had multiple EKGs and chest x-rays and labs but has not had any abdominal imaging. Discussed with patient we will send her over for CT today. Pain medicine and medications for antianxiety to be provided. ED Course:   Initial assessment performed. The patients presenting problems have been discussed, and they are in agreement with the care plan formulated and outlined with them. I have encouraged them to ask questions as they arise throughout their visit. EKG interpretation: (Preliminary)  Rhythm: Sinus rhythm at a rate of 90 bpm; normal CO; normal QRS; normal QTC. T wave inversions noted in lead III, otherwise normal EKG. This EKG was interpreted by ED Provider Douglas Grady MD    PROGRESS NOTE:  3:45 PM  Pt appears much more relaxed but remains sitting in the hallway. CT reviewed without acute pathology and her labs are reviewed without any acute changes. Discussed her symptoms with patient and recommend again that she reach out to GI for EGD, continue the twice a day omeprazole as recommended by her primary care doctor, and start the BuSpar as recommended by her primary care physician. Discharge note:  Pt re-evaluated and noted to be feeling jody, ready for discharge. Updated pt  on all final lab and CT findings. Will follow up as instructed. All questions have been answered, pt voiced understanding and agreement with plan. Specific return precautions provided as well as instructions to return to the ED should sx worsen at any time. Vital signs stable for discharge. Critical Care Time:   0      Diagnosis     Clinical Impression:   1. Atypical chest pain    2. Dyspepsia        PLAN:  1. Discharge Medication List as of 9/10/2021  3:45 PM        2.    Follow-up Information     Follow up With Specialties Details Why Contact Owen Beckett NP Nurse Practitioner Schedule an appointment as soon as possible for a visit   [address] [facility] [address] [facility].      [address] [facility]  If symptoms worsen [address] [phone]        Return to ED if worse     Disposition:  Home       Please note, this dictation was completed with [employer], the computer voice recognition software. Quite often unanticipated grammatical, syntax, homophones, and other interpretive errors are inadvertently transcribed by the computer software. Please disregard these errors. Please excuse any errors that have escaped final proof reading.

## 2021-09-12 LAB
ATRIAL RATE: 90 BPM
CALCULATED P AXIS, ECG09: 59 DEGREES
CALCULATED R AXIS, ECG10: 8 DEGREES
CALCULATED T AXIS, ECG11: 30 DEGREES
DIAGNOSIS, 93000: NORMAL
P-R INTERVAL, ECG05: 156 MS
Q-T INTERVAL, ECG07: 356 MS
QRS DURATION, ECG06: 78 MS
QTC CALCULATION (BEZET), ECG08: 435 MS
VENTRICULAR RATE, ECG03: 90 BPM

## 2021-09-13 ENCOUNTER — TELEPHONE (OUTPATIENT)
Dept: FAMILY MEDICINE CLINIC | Age: 51
End: 2021-09-13

## 2021-09-14 VITALS
OXYGEN SATURATION: 99 % | SYSTOLIC BLOOD PRESSURE: 180 MMHG | HEART RATE: 105 BPM | TEMPERATURE: 98.6 F | DIASTOLIC BLOOD PRESSURE: 90 MMHG | RESPIRATION RATE: 14 BRPM

## 2021-09-14 RX ORDER — DULOXETIN HYDROCHLORIDE 30 MG/1
30 CAPSULE, DELAYED RELEASE ORAL DAILY
Qty: 30 CAPSULE | Refills: 0 | Status: SHIPPED | OUTPATIENT
Start: 2021-09-14 | End: 2022-09-09

## 2021-09-14 NOTE — TELEPHONE ENCOUNTER
Patient seen in ER on 9/10/21 and is stating buspar not owrking for her makes her chest feel funny and wants to know if you can prescribe something else

## 2021-09-14 NOTE — TELEPHONE ENCOUNTER
Script sent to [facility] for low dose of Cymbalta. Will take a couple of weeks to Take for 2 weeks, we can then increase to 60mg.  Please follow up in 3 weeks

## 2021-10-08 ENCOUNTER — VIRTUAL VISIT (OUTPATIENT)
Dept: FAMILY MEDICINE CLINIC | Age: 51
End: 2021-10-08
Payer: COMMERCIAL

## 2021-10-08 DIAGNOSIS — K21.9 GASTROESOPHAGEAL REFLUX DISEASE, UNSPECIFIED WHETHER ESOPHAGITIS PRESENT: Primary | ICD-10-CM

## 2021-10-08 DIAGNOSIS — K58.0 IRRITABLE BOWEL SYNDROME WITH DIARRHEA: ICD-10-CM

## 2021-10-08 DIAGNOSIS — F41.9 ANXIETY: ICD-10-CM

## 2021-10-08 PROCEDURE — 99442 PR PHYS/QHP TELEPHONE EVALUATION 11-20 MIN: CPT | Performed by: NURSE PRACTITIONER

## 2021-10-08 NOTE — PROGRESS NOTES
Meek To is a 45 y.o. female evaluated via telephone on 10/8/2021. Consent:  She and/or health care decision maker is aware that that she may receive a bill for this telephone service, depending on her insurance coverage, and has provided verbal consent to proceed: Yes    CC: GERD, IBS follow up    HPI:  Meek To is a 45 y.o. female who presents today for a 1 month follow up for GERD, IBS, and anxiety. GERD  At the last appt she was c/o breakthrough symptoms despite taking Omeprazole 20mg BID. Dose was increased to 40mg BID and today she states the symptoms have improved. She avoids spicy and citrous foods. Eats small meals throughout the day. Saw CAYDEN Renteria a couple of years ago and had EGD and colonoscopy. IBS  She continues to have lower abdominal \"bloating\" after eating anything, despite following a bland diet. She does have anxiety. Anxiety  At the last appt she was prescribed Buspar and today she states her anxiety is under better control. She is still using Ativan 0.5mg daily prn. She has not wanted to take SSRI's in the past due to fear of weight gain. She has tried Wellbutrin in the past but could not tolerate it. Assessment/ Plan:     GERD  Improved  Cont Omeprazole to 40mg BID  Avoid spicy and citreus foods, caffeine, and alcohol. Eat smaller, more frequent meals. Do not lay down to go to sleep for at least 2 hours after eating. Need to reduce anxiety/ stress  F/U 3 months, if symptoms worsen, will refer back to Dr Kamla Renteria    IBS  Advised to try an OTC probiotic daily  Cont IBS diet  F/U 3 months or sooner prn    Anxiety  Improved  Cont Buspar   F/U 3 months      Documentation:  I communicated with the patient and/or health care decision maker about the plan of care as noted above.        I affirm this is a Patient Initiated Episode with an Established Patient who has not had a related appointment within my department in the past 7 days or scheduled within the next 24 hours.     Total Time: minutes: 11-20 minutes    Note: not billable if this call serves to triage the patient into an appointment for the relevant concern      Ursula White NP

## 2021-10-08 NOTE — PROGRESS NOTES
1. Have you been to the ER, urgent care clinic since your last visit? Hospitalized since your last visit? No    2. Have you seen or consulted any other health care providers outside of the [address] [place] [address] since your last visit? Include any pap smears or colon screening.  No     Chief Complaint   Patient presents with    GERD    Irritable Bowel Syndrome     Patient-Reported Vitals 10/8/2021   Patient-Reported Weight -   Patient-Reported Pulse 68   Patient-Reported Temperature 98.2   Patient-Reported SpO2 98%   Patient-Reported Systolic  769   Patient-Reported Diastolic 79

## 2021-10-19 ENCOUNTER — CLINICAL SUPPORT (OUTPATIENT)
Dept: FAMILY MEDICINE CLINIC | Age: 51
End: 2021-10-19

## 2021-10-19 DIAGNOSIS — Z11.1 PPD SCREENING TEST: Primary | ICD-10-CM

## 2021-10-21 LAB
MM INDURATION POC: 0 MM (ref 0–5)
PPD POC: NEGATIVE NEGATIVE

## 2022-02-09 DIAGNOSIS — F41.9 ANXIETY: ICD-10-CM

## 2022-02-09 RX ORDER — LORAZEPAM 0.5 MG/1
TABLET ORAL
Qty: 15 TABLET | Refills: 0 | Status: SHIPPED | OUTPATIENT
Start: 2022-02-09

## 2022-02-14 ENCOUNTER — VIRTUAL VISIT (OUTPATIENT)
Dept: FAMILY MEDICINE CLINIC | Age: 52
End: 2022-02-14
Payer: COMMERCIAL

## 2022-02-14 DIAGNOSIS — R42 DIZZINESSES: Primary | ICD-10-CM

## 2022-02-14 DIAGNOSIS — R42 LIGHTHEADEDNESS: ICD-10-CM

## 2022-02-14 PROCEDURE — 99442 PR PHYS/QHP TELEPHONE EVALUATION 11-20 MIN: CPT | Performed by: NURSE PRACTITIONER

## 2022-02-14 NOTE — PROGRESS NOTES
1. Have you been to the ER, urgent care clinic since your last visit? Hospitalized since your last visit? Yes When: ER 2-6-22    2. Have you seen or consulted any other health care providers outside of the [address] [place] [address] since your last visit? Include any pap smears or colon screening.  No

## 2022-02-14 NOTE — LETTER
2/14/2022 11:46 AM    [facility][1] CHILDREN  [address] [facility] [id number]      To Whom It May Concern:    Mrs. Vimal Aggarwal is a patient under my care. Please excuse her from work today 2-14-22 and tomorrow 2-15-22. She may return to work Wednesday 2-16-22 if she is feeling better.             Sincerely,      Praful Fleming NP

## 2022-02-14 NOTE — PROGRESS NOTES
Bennett Buerger is a 45 y.o. female evaluated via telephone on 2/14/2022. Consent:  She and/or health care decision maker is aware that that she may receive a bill for this telephone service, depending on her insurance coverage, and has provided verbal consent to proceed: Yes    CC: ER visit follow up    HPI    Bennett Buerger is a 45 y.o. female who presents today to follow up for an ER visit. She went to the ER at [facility] in [address] [facility] on 2-6-22 with c/o palpitations and lightheadedness and generalized weakness x two weeks. Denied any nausea, vomiting, diarrhea, chest pain, shortness of breath, or neurological symptoms. Labs were benign. CXR was normal. Covid test was neg. EKG showed \"NSR with sinus arrhythmia at 84 beats per minute, cannot rule out anterior infarct, age undetermined. \"     She was discharged home with a 72 hour Holter monitor with cardiology follow-up. Completed it but has not heard any results back yet. Has not made an appt yet with the cardiologist (Dr Manasa Guerra in [address] [facility]). She was advised to do so. She still does not feel well. Lost her balance this morning at 5am. Took a meclizine (hx of vertigo). She does not feel comfortable driving or going to work. PLAN    Lightheadedness and Dizziness  Advised to make appt with cardiology  If neg work=up, she is to follow up with me and I will refer her to neuro  Work excuse typed up- states her  can come pick it up today  Advised to cut back on sodium in the diet and cont taking Meclizine for now PRN dizziness        Documentation:  I communicated with the patient and/or health care decision maker about the plan of care as noted above. I affirm this is a Patient Initiated Episode with an Established Patient who has not had a related appointment within my department in the past 7 days or scheduled within the next 24 hours.     Total Time: minutes: 11-20 minutes    Note: not billable if this call serves to triage the patient into an appointment for the relevant concern      Aryan Falcon NP

## 2022-02-18 ENCOUNTER — TELEPHONE (OUTPATIENT)
Dept: FAMILY MEDICINE CLINIC | Age: 52
End: 2022-02-18

## 2022-02-18 DIAGNOSIS — R42 DIZZINESSES: Primary | ICD-10-CM

## 2022-02-18 NOTE — LETTER
2/19/2022 8:35 AM    Ms. [facility] CHILDREN  [address] [facility] [id number]        To Whom It May Concern:    Mrs. Dariusz Mcconnell is a patient under my care. Please excuse her from work Monday through Thursday 2/21/22 through 2/24/22. She may return to work Friday 2/25/22 if she is feeling better.           Sincerely,      Iman Gaytan NP

## 2022-02-18 NOTE — TELEPHONE ENCOUNTER
----- Message from Lb Guzman sent at 2/18/2022  9:57 AM EST -----  Subject: Message to Provider    QUESTIONS  Information for Provider? Patient is calling to report she is still having   vertigo. She is supposed to return to work Monday 2/21 but she doesn't   feel she is able to return. Could she have a note keeping her off work   until Thursday when she has an appointment for vestibular therapy next   week. Please call and advise.   ---------------------------------------------------------------------------  --------------  CALL BACK INFO  What is the best way for the office to contact you? OK to leave message on   voicemail  Preferred Call Back Phone Number? [phone]  ---------------------------------------------------------------------------  --------------  SCRIPT ANSWERS  Relationship to Patient?  Self

## 2022-02-19 NOTE — TELEPHONE ENCOUNTER
Work excuse typed and printed. However she does need to make appt with ENT as advised by the neurologist during her last ER visit on 2-15-22. If she has not been referred yet we can refer her to Dr Nathan Gomez. We need to get to the bottom of it.  Please have her follow up with me in 1 month

## 2022-02-22 ENCOUNTER — DOCUMENTATION ONLY (OUTPATIENT)
Dept: FAMILY MEDICINE CLINIC | Age: 52
End: 2022-02-22

## 2022-03-14 ENCOUNTER — DOCUMENTATION ONLY (OUTPATIENT)
Dept: FAMILY MEDICINE CLINIC | Age: 52
End: 2022-03-14

## 2022-03-15 NOTE — PROGRESS NOTES
Pt seen by ENT Dr Priscilla Uribe for vertigo on 3-9-22. He recommended she cont going to PT and cont Meclizine prn.

## 2022-03-18 PROBLEM — K58.0 IRRITABLE BOWEL SYNDROME WITH DIARRHEA: Status: ACTIVE | Noted: 2021-09-08

## 2022-03-18 PROBLEM — N85.2 ENLARGED UTERUS: Status: ACTIVE | Noted: 2019-06-27

## 2022-03-18 PROBLEM — E66.01 OBESITY, MORBID (HCC): Status: ACTIVE | Noted: 2018-09-10

## 2022-03-19 PROBLEM — F51.01 PRIMARY INSOMNIA: Status: ACTIVE | Noted: 2018-09-10

## 2022-03-19 PROBLEM — K76.0 FATTY INFILTRATION OF LIVER: Status: ACTIVE | Noted: 2019-06-27

## 2022-03-19 PROBLEM — H81.10 BENIGN PAROXYSMAL POSITIONAL VERTIGO: Status: ACTIVE | Noted: 2021-06-23

## 2022-03-19 PROBLEM — K42.9 UMBILICAL HERNIA: Status: ACTIVE | Noted: 2019-06-26

## 2022-03-19 PROBLEM — F41.9 ANXIETY: Status: ACTIVE | Noted: 2017-08-21

## 2022-03-19 PROBLEM — E78.00 PURE HYPERCHOLESTEROLEMIA: Status: ACTIVE | Noted: 2020-11-19

## 2022-03-23 ENCOUNTER — VIRTUAL VISIT (OUTPATIENT)
Dept: FAMILY MEDICINE CLINIC | Age: 52
End: 2022-03-23
Payer: COMMERCIAL

## 2022-03-23 DIAGNOSIS — H81.10 BENIGN PAROXYSMAL POSITIONAL VERTIGO, UNSPECIFIED LATERALITY: Primary | ICD-10-CM

## 2022-03-23 PROCEDURE — 99441 PR PHYS/QHP TELEPHONE EVALUATION 5-10 MIN: CPT | Performed by: NURSE PRACTITIONER

## 2022-03-23 RX ORDER — MECLIZINE HYDROCHLORIDE 25 MG/1
TABLET ORAL
Qty: 90 TABLET | Refills: 1 | Status: SHIPPED | OUTPATIENT
Start: 2022-03-23 | End: 2022-07-25

## 2022-03-23 NOTE — PROGRESS NOTES
1. \"Have you been to the ER, urgent care clinic since your last visit? Hospitalized since your last visit? \" No    2. \"Have you seen or consulted any other health care providers outside of the [address] [place] [address] since your last visit? \" No     3. For patients aged 39-70: Has the patient had a colonoscopy / FIT/ Cologuard? Yes - no Care Gap present      If the patient is female:    4. For patients aged 41-77: Has the patient had a mammogram within the past 2 years? Yes - no Care Gap present      5. For patients aged 21-65: Has the patient had a pap smear? Yes - Care Gap present.  Most recent result on file      Chief Complaint   Patient presents with    Dizziness     Patient-Reported Vitals 3/23/2022   Patient-Reported Weight 210lbs   Patient-Reported Pulse 89   Patient-Reported Temperature -   Patient-Reported SpO2 -   Patient-Reported Systolic  908   Patient-Reported Diastolic 79

## 2022-03-23 NOTE — PROGRESS NOTES
Wang Moreira is a 45 y.o. female evaluated via telephone on 3/23/2022. Consent:  She and/or health care decision maker is aware that that she may receive a bill for this telephone service, depending on her insurance coverage, and has provided verbal consent to proceed: Yes    CC: vertigo    HPI    Wang Moreira is a 45 y.o. female who presents today to follow up for vertigo. She went to the ER at [facility] in [address] on 2-6-22 with c/o palpitations and lightheadedness and generalized weakness x two weeks. Denied any nausea, vomiting, diarrhea, chest pain, shortness of breath, or neurological symptoms. Labs were benign. CXR was normal. Covid test was neg. EKG showed \"NSR with sinus arrhythmia at 84 beats per minute, cannot rule out anterior infarct, age undetermined. \"     She was discharged home with a referral to PT for vertigo and a 72 hour Holter monitor with cardiology follow-up. States her results showed nothing concerning. She then saw ENT Dr Glenny Santiago on 3-9-22. He recommended she cont going to PT and cont Meclizine prn. Taking tid. States PT is helping somewhat. She also saw the audiologist who told her she has mild hearing loss in L ear. Will f/u in 6 month. PLAN    Vertigo  Improving with PT  Cont PT   Cont Meclizine TID prn- refills given  F/u as needed with ENT  F/U here 3-6 months       Documentation:  I communicated with the patient and/or health care decision maker about the plan of care as noted above. I affirm this is a Patient Initiated Episode with an Established Patient who has not had a related appointment within my department in the past 7 days or scheduled within the next 24 hours.     Total Time: 5-10 minutes    Note: not billable if this call serves to triage the patient into an appointment for the relevant concern      Marcus Salvador NP

## 2022-05-20 ENCOUNTER — VIRTUAL VISIT (OUTPATIENT)
Dept: FAMILY MEDICINE CLINIC | Age: 52
End: 2022-05-20
Payer: COMMERCIAL

## 2022-05-20 DIAGNOSIS — J01.90 ACUTE NON-RECURRENT SINUSITIS, UNSPECIFIED LOCATION: Primary | ICD-10-CM

## 2022-05-20 DIAGNOSIS — J45.21 MILD INTERMITTENT ASTHMA WITH EXACERBATION: ICD-10-CM

## 2022-05-20 PROCEDURE — 99441 PR PHYS/QHP TELEPHONE EVALUATION 5-10 MIN: CPT | Performed by: NURSE PRACTITIONER

## 2022-05-20 RX ORDER — ALBUTEROL SULFATE 90 UG/1
2 AEROSOL, METERED RESPIRATORY (INHALATION)
Qty: 18 G | Refills: 0 | Status: SHIPPED | OUTPATIENT
Start: 2022-05-20

## 2022-05-20 RX ORDER — AZITHROMYCIN 250 MG/1
TABLET, FILM COATED ORAL
Qty: 6 TABLET | Refills: 0 | Status: SHIPPED | OUTPATIENT
Start: 2022-05-20 | End: 2022-05-25

## 2022-05-20 RX ORDER — PREDNISONE 10 MG/1
TABLET ORAL
Qty: 21 TABLET | Refills: 0 | Status: SHIPPED | OUTPATIENT
Start: 2022-05-20 | End: 2022-09-09 | Stop reason: ALTCHOICE

## 2022-05-20 NOTE — PROGRESS NOTES
Chief Complaint   Patient presents with    Cough     with nasal congestion, yellow mucus, wheezing for over a week     /81 T 97.7  HR 77  02 96    1. \"Have you been to the ER, urgent care clinic since your last visit? Hospitalized since your last visit? \" No    2. \"Have you seen or consulted any other health care providers outside of the [address] [place] [address] since your last visit? \" No     3. For patients aged 39-70: Has the patient had a colonoscopy / FIT/ Cologuard? Yes - no Care Gap present      If the patient is female:    4. For patients aged 41-77: Has the patient had a mammogram within the past 2 years? Yes - no Care Gap present      5. For patients aged 21-65: Has the patient had a pap smear? No      Identified pt with two pt identifiers(name and ). Reviewed record in preparation for visit and have obtained necessary documentation.     Symptom review:    NO  Fever   YES  Shaking chills  YES  Cough  YES Headaches  NO  Body aches  NO  Coughing up blood  YES  Chest congestion  NO  Chest pain  YES  Shortness of breath  NO  Profound Loss of smell/taste  NO  Nausea/Vomiting   NO  Loose stool/Diarrhea  NO  any skin issues

## 2022-05-20 NOTE — PROGRESS NOTES
Daniella Boyd is a 45 y.o. female evaluated via telephone on 5/20/2022. Consent:  She and/or health care decision maker is aware that that she may receive a bill for this telephone service, depending on her insurance coverage, and has provided verbal consent to proceed: Yes    CC: cough    HPI  Daniella Boyd is a 46yo female who presents today via telephone visit with c/o a runny nose, sneezing, cough, wheezing, and mild SOB that started 6 days ago. She denies any headaches but her head feels \"heavy. \" She took a home Covid test the day after her symptoms began, 3 days later, and again yesterday and all 3 are negative. She has a hx of mild intermittent asthma. Does not smoke. PLAN    Acute sinusitis with asthma exacerbation  Start Azithromycin 250mg- take 2 tabs po x 1 today then take 1 tab daily x 4 days  Start prednisone 10mg- take 6 pills today then take 1 less pill every day until gone (#21, 0R)  Start Albuterol 90mcg/act- Inhale 2 puffs [id number] prn SOB, wheezing, or chest tightness. Drink plenty of fluids and rest.  F/U prn if symptoms worsen or do not improve. Documentation:  I communicated with the patient and/or health care decision maker about the plan of care as noted above. I affirm this is a Patient Initiated Episode with an Established Patient who has not had a related appointment within my department in the past 7 days or scheduled within the next 24 hours.     Total Time: minutes: 5-10 minutes    Note: not billable if this call serves to triage the patient into an appointment for the relevant concern      Taylor Serna NP

## 2022-07-24 DIAGNOSIS — H81.10 BENIGN PAROXYSMAL POSITIONAL VERTIGO, UNSPECIFIED LATERALITY: ICD-10-CM

## 2022-07-25 RX ORDER — MECLIZINE HYDROCHLORIDE 25 MG/1
TABLET ORAL
Qty: 90 TABLET | Refills: 0 | Status: SHIPPED | OUTPATIENT
Start: 2022-07-25 | End: 2022-10-06

## 2022-09-09 ENCOUNTER — OFFICE VISIT (OUTPATIENT)
Dept: FAMILY MEDICINE CLINIC | Age: 52
End: 2022-09-09
Payer: COMMERCIAL

## 2022-09-09 VITALS
TEMPERATURE: 98.1 F | RESPIRATION RATE: 29 BRPM | DIASTOLIC BLOOD PRESSURE: 88 MMHG | OXYGEN SATURATION: 98 % | WEIGHT: 225 LBS | HEART RATE: 74 BPM | HEIGHT: 62 IN | SYSTOLIC BLOOD PRESSURE: 122 MMHG | BODY MASS INDEX: 41.41 KG/M2

## 2022-09-09 DIAGNOSIS — M26.629 TMJ SYNDROME: ICD-10-CM

## 2022-09-09 DIAGNOSIS — K64.9 BLEEDING HEMORRHOID: Primary | ICD-10-CM

## 2022-09-09 PROCEDURE — 99214 OFFICE O/P EST MOD 30 MIN: CPT | Performed by: NURSE PRACTITIONER

## 2022-09-09 RX ORDER — HYDROCORTISONE 25 MG/G
CREAM TOPICAL 4 TIMES DAILY
Qty: 30 G | Refills: 0 | Status: SHIPPED | OUTPATIENT
Start: 2022-09-09 | End: 2022-09-30

## 2022-09-09 NOTE — PROGRESS NOTES
Subjective:     Chief Complaint   Patient presents with    Hemorrhoids    Jaw Pain       Eliot Olson is a 45 y.o. female who presents today with c/o hemorrhoids and jaw pain. Hemorrhoids  She has noticed a small amount of blood on the toilet paper after she wipes from having a BM. Started 2.5 months ago. Denies any constipation or rectal pain. She tried using an OTC suppository and prep H cream which briefly resolves the problem but they it returns. She cannot see any external hemorroids, she used a mirror to look. Believes they are internal. Had colonosocopy in 2019. Report does mention small grade 1 hemorrhoids. Jaw pain, left side  Pain started 2 days ago while chewing food. Denies any injury. She can feel a popping/clicking sensation when she opens her jaw. States she does not grind her teeth. Has not taken anything for it.     Patient Active Problem List   Diagnosis Code    GERD (gastroesophageal reflux disease) K21.9    Asthma J45.909    Depression with anxiety F41.8    Anxiety F41.9    Obesity, morbid ([address] E66.01    BMI 40.0-44.9, adult ([facility] [address] [address] Z68.41    Primary insomnia Q40.41    Umbilical hernia B60.5    Fatty infiltration of liver K76.0    Enlarged uterus N85.2    Pure hypercholesterolemia E78.00    Benign paroxysmal positional vertigo H81.10    Irritable bowel syndrome with diarrhea K58.0       Past Medical History:   Diagnosis Date    Asthma     Depression     Depression with anxiety 5/12/2015    GERD (gastroesophageal reflux disease)     Menopause          Current Outpatient Medications:     meclizine (ANTIVERT) 25 mg tablet, TAKE 1 TABLET BY MOUTH THREE TIMES DAILY AS NEEDED FOR DIZZINESS, Disp: 90 Tablet, Rfl: 0    albuterol (PROVENTIL HFA, VENTOLIN HFA, PROAIR HFA) 90 mcg/actuation inhaler, Take 2 Puffs by inhalation every four (4) hours as needed for Wheezing., Disp: 18 g, Rfl: 0    LORazepam (ATIVAN) 0.5 mg tablet, TAKE AS DIRECTED, Disp: 15 Tablet, Rfl: 0    omeprazole (PRILOSEC) 40 mg capsule, Take 1 capsule by mouth twice daily, Disp: 180 Capsule, Rfl: 1    docosahexanoic acid/epa (FISH OIL PO), Take  by mouth., Disp: , Rfl:     multivitamin (ONE A DAY) tablet, Take 1 Tab by mouth daily. , Disp: , Rfl:     predniSONE (STERAPRED DS) 10 mg dose pack, See administration instruction per 10mg dose pack (Patient not taking: Reported on 9/9/2022), Disp: 21 Tablet, Rfl: 0    DULoxetine (CYMBALTA) 30 mg capsule, Take 1 Capsule by mouth daily. (Patient not taking: No sig reported), Disp: 30 Capsule, Rfl: 0    dicyclomine (BENTYL) 20 mg tablet, TAKE 1 TABLET BY MOUTH EVERY 6 HOURS AS NEEDED FOR ABDOMINAL DISCOMFORT (Patient not taking: Reported on 9/9/2022), Disp: , Rfl:     No Known Allergies    Past Surgical History:   Procedure Laterality Date    HX GYN  1994    tubal ligation       Social History     Tobacco Use   Smoking Status Never   Smokeless Tobacco Never       Social History     Socioeconomic History    Marital status:    Tobacco Use    Smoking status: Never    Smokeless tobacco: Never   Substance and Sexual Activity    Alcohol use: No     Alcohol/week: 0.0 standard drinks       No family history on file. ROS:  Gen: denies fever, chills, or fatigue  HEENT: +left sided jaw pain with clicking sound, denies bruxism, H/A, nasal congestion, ear pain, or sore throat  Resp: denies dyspnea, cough, or wheezing  CV: denies chest pain, pressure, or palpitations  Extremeties: denies edema  GI[de-identified] Blood on TP after BM. denies abdominal pain, nausea, vomiting, diarrhea, or constipation.  Denies black or bloody stools or rectal pain  Neuro: denies numbness/tingling or dizziness  Skin: denies rashes or new lesions   Psych: denies anxiety, depression, bridget, or other changes in mood      Objective:     Visit Vitals  BP (!) 128/90 (BP 1 Location: Left arm)   Pulse 74   Temp 98.1 °F (36.7 °C)   Resp 29   Ht 5' 2\" (1.575 m)   Wt 225 lb (102.1 kg)   SpO2 98%   BMI 41.15 kg/m²     Body mass index is 41.15 kg/m². General: Alert and oriented. No acute distress. Well nourished. HEENT :  Eyes: Sclera white, conjunctiva clear. PERRLA. Extra ocular movements intact. Neck: Supple with FROM. +TMJ click with good ROM  Lungs: Breathing even and unlabored. All lobes clear to auscultation bilaterally   Heart :RRR, S1 and S2 normal intensity, no extra heart sounds  Extremities: Non-edematous   Neuro: Cranial nerves grossly normal.  Psych: Mood and thought content appropriate for situation. Dressed appropriately and with good hygiene. Skin: Warm, dry, and intact. No lesions or discoloration. Assessment/ Plan:     Hemorrhoids with bleeding  Script sent for anusol cream- use as directed  Prevent constipation- may take OTC stool softener and/or fiber supp daily  F/U prn if symptoms worsen or do not improve. TMJ Syndrome  Advised to start OTC ibuprofen or Aleve or other NSAID BID x 1 week  F/U prn if symptoms worsen or do not improve. Verbal and written instructions (see AVS) provided. Patient expresses understanding of diagnosis and treatment plan. Health Maintenance Due   Topic Date Due    Hepatitis C Screening  Never done    Pneumococcal 0-64 years (1 - PCV) Never done    Cervical cancer screen  Never done    Shingrix Vaccine Age 50> (1 of 2) Never done    COVID-19 Vaccine (3 - Booster for Moderna series) 01/25/2022    Flu Vaccine (1) Never done               Gisela De lOiveira NP

## 2022-09-09 NOTE — PROGRESS NOTES
1. \"Have you been to the ER, urgent care clinic since your last visit? Hospitalized since your last visit? \" No    2. \"Have you seen or consulted any other health care providers outside of the [address] [place] [address] since your last visit? \" No     3. For patients aged 39-70: Has the patient had a colonoscopy / FIT/ Cologuard? Yes - no Care Gap present     If the patient is female:    4. For patients aged 41-77: Has the patient had a mammogram within the past 2 years? No    5. For patients aged 21-65: Has the patient had a pap smear?  Yes - no Care Gap present

## 2022-09-19 DIAGNOSIS — K21.9 GASTROESOPHAGEAL REFLUX DISEASE: ICD-10-CM

## 2022-09-19 RX ORDER — OMEPRAZOLE 40 MG/1
CAPSULE, DELAYED RELEASE ORAL
Qty: 180 CAPSULE | Refills: 0 | Status: SHIPPED | OUTPATIENT
Start: 2022-09-19

## 2022-09-30 RX ORDER — HYDROCORTISONE 25 MG/G
CREAM TOPICAL
Qty: 28 G | Refills: 0 | Status: SHIPPED | OUTPATIENT
Start: 2022-09-30

## 2022-10-06 DIAGNOSIS — H81.10 BENIGN PAROXYSMAL POSITIONAL VERTIGO, UNSPECIFIED LATERALITY: ICD-10-CM

## 2022-10-06 RX ORDER — MECLIZINE HYDROCHLORIDE 25 MG/1
TABLET ORAL
Qty: 90 TABLET | Refills: 0 | Status: SHIPPED | OUTPATIENT
Start: 2022-10-06

## 2022-10-12 ENCOUNTER — DOCUMENTATION ONLY (OUTPATIENT)
Dept: FAMILY MEDICINE CLINIC | Age: 52
End: 2022-10-12

## 2022-10-12 NOTE — PROGRESS NOTES
Seen by gi (MARISELA Ramesh) for rectal bleeding. He recommended a daily fiber supplement and prescribed hydrocortisone/lidocaine suppositories. Consider banding if no improvement.

## 2022-11-07 ENCOUNTER — OFFICE VISIT (OUTPATIENT)
Dept: FAMILY MEDICINE CLINIC | Age: 52
End: 2022-11-07
Payer: COMMERCIAL

## 2022-11-07 VITALS
SYSTOLIC BLOOD PRESSURE: 124 MMHG | DIASTOLIC BLOOD PRESSURE: 80 MMHG | HEART RATE: 68 BPM | BODY MASS INDEX: 41.51 KG/M2 | TEMPERATURE: 98.1 F | HEIGHT: 62 IN | WEIGHT: 225.6 LBS | OXYGEN SATURATION: 98 %

## 2022-11-07 DIAGNOSIS — K64.8 INTERNAL HEMORRHOIDS: Primary | ICD-10-CM

## 2022-11-07 DIAGNOSIS — J45.40 MODERATE PERSISTENT ASTHMA WITHOUT COMPLICATION: ICD-10-CM

## 2022-11-07 DIAGNOSIS — K21.9 GASTROESOPHAGEAL REFLUX DISEASE WITHOUT ESOPHAGITIS: ICD-10-CM

## 2022-11-07 LAB — HGB BLD-MCNC: 13.1 G/DL

## 2022-11-07 PROCEDURE — 85018 HEMOGLOBIN: CPT | Performed by: NURSE PRACTITIONER

## 2022-11-07 PROCEDURE — 99214 OFFICE O/P EST MOD 30 MIN: CPT | Performed by: NURSE PRACTITIONER

## 2022-11-07 RX ORDER — FLUTICASONE PROPIONATE 44 UG/1
1 AEROSOL, METERED RESPIRATORY (INHALATION) 2 TIMES DAILY
Qty: 10.6 G | Refills: 0 | Status: SHIPPED | OUTPATIENT
Start: 2022-11-07 | End: 2022-11-23

## 2022-11-07 RX ORDER — FAMOTIDINE 20 MG/1
20 TABLET, FILM COATED ORAL 2 TIMES DAILY
Qty: 60 TABLET | Refills: 2 | Status: SHIPPED | OUTPATIENT
Start: 2022-11-07

## 2022-11-07 NOTE — PROGRESS NOTES
Identified pt with two pt identifiers(name and ). Reviewed record in preparation for visit and have obtained necessary documentation. No chief complaint on file. 1. \"Have you been to the ER, urgent care clinic since your last visit? Hospitalized since your last visit? \" {Yes when where and reason for visit:}    2. \"Have you seen or consulted any other health care providers outside of the [address] [place] [address] since your last visit? \" {Yes when where and reason for visit:}     3. For patients aged 39-70: Has the patient had a colonoscopy / FIT/ Cologuard? {Colon Cancer Care Gap present?:28689}      If the patient is female:    4. For patients aged 41-77: Has the patient had a mammogram within the past 2 years? {Cancer Care Gap present?:02697}      5. For patients aged 21-65: Has the patient had a pap smear?  {Cancer Care Gap present?:02591}

## 2022-11-07 NOTE — ACP (ADVANCE CARE PLANNING)
Identified pt with two pt identifiers(name and ). Reviewed record in preparation for visit and have obtained necessary documentation. Chief Complaint   Patient presents with    Epigastric Pain    Hemorrhoids       1. \"Have you been to the ER, urgent care clinic since your last visit? Hospitalized since your last visit? \" No    2. \"Have you seen or consulted any other health care providers outside of the [address] [place] [address] since your last visit? \" No     3. For patients aged 39-70: Has the patient had a colonoscopy / FIT/ Cologuard? Yes - no Care Gap present      If the patient is female:    4. For patients aged 41-77: Has the patient had a mammogram within the past 2 years? No      5. For patients aged 21-65: Has the patient had a pap smear?  No

## 2022-11-07 NOTE — PROGRESS NOTES
Subjective:     CC: hemorrhoids, epigastric pain      Lovely Arreguin is a 45 y.o. female who presents today to follow up for internal hemorrhoids and GERD. Hemorrhoids  Earlier this year she noticed a small amount of blood on the toilet paper after she wipes from having a BM. She tried using an OTC suppository and prep H cream which briefly resolved the problem but then it returns. Had colonosocopy in 2019. Report does mention small grade 1 hemorrhoids. She was referred to GI. Last month she was seen by gi (MARISELA Ramesh). He recommended a daily fiber supplement and prescribed hydrocortisone/lidocaine suppositories. Consider banding if no improvement. Today she states the bleeding is better but she is still having a burning sensation when she has a bowel movement. Recommended she follow up with GI.    GERD  She reports chest discomfort and early satiety. She has been taking Prilosec 40mg BID. Avoids trigger foods and tries to sit upright after meals. Will try adding Pepcid 20mg BID and if no improvement she was told she will likely need EGD with GI. Asthma  Lately she has been SOB. It occurs randomly, not on exertion, separate from the GERD symptoms. Denies coughing or wheezing. No fever or chills. Has had to use Albuterol more frequently. Will start daily ICS (Flovent) today.      Patient Active Problem List   Diagnosis Code    GERD (gastroesophageal reflux disease) K21.9    Asthma J45.909    Depression with anxiety F41.8    Anxiety F41.9    Obesity, morbid ([address] E66.01    BMI 40.0-44.9, adult ([address] Z68.41    Primary insomnia P51.67    Umbilical hernia X26.9    Fatty infiltration of liver K76.0    Enlarged uterus N85.2    Pure hypercholesterolemia E78.00    Benign paroxysmal positional vertigo H81.10    Irritable bowel syndrome with diarrhea K58.0       Past Medical History:   Diagnosis Date    Asthma     Depression     Depression with anxiety 5/12/2015    GERD (gastroesophageal reflux disease) Menopause          Current Outpatient Medications:     meclizine (ANTIVERT) 25 mg tablet, TAKE 1 TABLET BY MOUTH THREE TIMES DAILY AS NEEDED FOR DIZZINESS, Disp: 90 Tablet, Rfl: 0    Procto-Med HC 2.5 % rectal cream, INSERT INTO RECTUM FOUR TIMES A DAILY, Disp: 28 g, Rfl: 0    omeprazole (PRILOSEC) 40 mg capsule, Take 1 capsule by mouth twice daily, Disp: 180 Capsule, Rfl: 0    albuterol (PROVENTIL HFA, VENTOLIN HFA, PROAIR HFA) 90 mcg/actuation inhaler, Take 2 Puffs by inhalation every four (4) hours as needed for Wheezing., Disp: 18 g, Rfl: 0    LORazepam (ATIVAN) 0.5 mg tablet, TAKE AS DIRECTED, Disp: 15 Tablet, Rfl: 0    docosahexanoic acid/epa (FISH OIL PO), Take  by mouth., Disp: , Rfl:     multivitamin (ONE A DAY) tablet, Take 1 Tab by mouth daily. , Disp: , Rfl:     No Known Allergies    Past Surgical History:   Procedure Laterality Date    HX GYN  1994    tubal ligation       Social History     Tobacco Use   Smoking Status Never   Smokeless Tobacco Never       Social History     Socioeconomic History    Marital status:    Tobacco Use    Smoking status: Never    Smokeless tobacco: Never   Substance and Sexual Activity    Alcohol use: No     Alcohol/week: 0.0 standard drinks       No family history on file. ROS:  Gen: denies fever, chills, or fatigue  HEENT: denies H/A, nasal congestion, ear pain, or sore throat  Resp: + dyspnea, denies cough or wheezing  CV: +occas chest discomfort after meals, denies palpitations  Extremeties: denies edema  GI[de-identified] +rectal burning, denies nausea, vomiting, diarrhea, or constipation.  Denies black or bloody stools  Neuro: denies numbness/tingling or dizziness  Skin: denies rashes or new lesions   Psych: denies anxiety, depression, bridget, or other changes in mood      Objective:     Visit Vitals  /80 (BP 1 Location: Left upper arm)   Pulse 68   Temp 98.1 °F (36.7 °C) (Oral)   Ht 5' 2\" (1.575 m)   Wt 225 lb 9.6 oz (102.3 kg)   SpO2 98%   BMI 41.26 kg/m² General: Alert and oriented. No acute distress. Well nourished. HEENT :  Eyes: Sclera white, conjunctiva clear. PERRLA. Extra ocular movements intact. Neck: Supple with FROM. Lungs: Breathing even and unlabored. All lobes clear to auscultation bilaterally   Heart :RRR, S1 and S2 normal intensity, no extra heart sounds  Extremities: Non-edematous   Neuro: Cranial nerves grossly normal.  Psych: Mood and thought content appropriate for situation. Dressed appropriately and with good hygiene. Skin: Warm, dry, and intact. No lesions or discoloration. Assessment/ Plan:     Internal Hemorrhoids   Denies any bleeding  POC stable at HGB 13.1  Reports internal rectal burning despite using lidocaine cream and fiber supp daily  Advised to follow up with GI      GERD  Uncontrolled  Start Pepcid 20mg BID  Cont Prilosec 40mg BID  Avoid triggers  Do not lay down to go to sleep for at least 2 hours after eating. F/U 1 month, if no improvement, follow up with GI      Asthma  Worsening  Start Flovent 44mcg/act- 1 puff BID- s/e reviewed  Cont Albuterol prn  F/U 1 month      Verbal and written instructions (see AVS) provided. Patient expresses understanding of diagnosis and treatment plan. Health Maintenance Due   Topic Date Due    Hepatitis C Screening  Never done    Pneumococcal 0-64 years (1 - PCV) Never done    Cervical cancer screen  Never done    Shingrix Vaccine Age 50> (1 of 2) Never done    COVID-19 Vaccine (3 - Booster for Moderna series) 10/20/2021    Flu Vaccine (1) Never done    Breast Cancer Screen Mammogram  11/10/2022               Caitlyn Landon, NP

## 2022-11-07 NOTE — PROGRESS NOTES
Identified pt with two pt identifiers(name and ). Reviewed record in preparation for visit and have obtained necessary documentation. Chief Complaint   Patient presents with    Epigastric Pain    Hemorrhoids       1. \"Have you been to the ER, urgent care clinic since your last visit? Hospitalized since your last visit? \" No    2. \"Have you seen or consulted any other health care providers outside of the [address] [place] [address] since your last visit? \" No     3. For patients aged 39-70: Has the patient had a colonoscopy / FIT/ Cologuard? Yes - no Care Gap present      If the patient is female:    4. For patients aged 41-77: Has the patient had a mammogram within the past 2 years? No      5. For patients aged 21-65: Has the patient had a pap smear?  Yes - no Care Gap present

## 2022-11-14 ENCOUNTER — TELEPHONE (OUTPATIENT)
Dept: FAMILY MEDICINE CLINIC | Age: 52
End: 2022-11-14

## 2022-11-16 ENCOUNTER — HOSPITAL ENCOUNTER (EMERGENCY)
Age: 52
Discharge: HOME | End: 2022-11-16
Attending: EMERGENCY MEDICINE
Payer: COMMERCIAL

## 2022-11-16 ENCOUNTER — APPOINTMENT (OUTPATIENT)
Dept: GENERAL RADIOLOGY | Age: 52
End: 2022-11-16
Attending: EMERGENCY MEDICINE
Payer: COMMERCIAL

## 2022-11-16 VITALS
SYSTOLIC BLOOD PRESSURE: 174 MMHG | BODY MASS INDEX: 41.15 KG/M2 | TEMPERATURE: 98.8 F | DIASTOLIC BLOOD PRESSURE: 96 MMHG | RESPIRATION RATE: 14 BRPM | HEART RATE: 85 BPM | OXYGEN SATURATION: 100 % | WEIGHT: 225 LBS

## 2022-11-16 DIAGNOSIS — J45.21 MILD INTERMITTENT ASTHMA WITH ACUTE EXACERBATION: Primary | ICD-10-CM

## 2022-11-16 PROCEDURE — 71046 X-RAY EXAM CHEST 2 VIEWS: CPT

## 2022-11-16 PROCEDURE — 99283 EMERGENCY DEPT VISIT LOW MDM: CPT

## 2022-11-16 RX ORDER — METHYLPREDNISOLONE 4 MG/1
TABLET ORAL
Qty: 1 DOSE PACK | Refills: 0 | Status: SHIPPED | OUTPATIENT
Start: 2022-11-16

## 2022-11-16 NOTE — ED PROVIDER NOTES
EMERGENCY DEPARTMENT HISTORY AND PHYSICAL EXAM      Date: 11/16/2022  Patient Name: Louise Borja    History of Presenting Illness     Chief Complaint   Patient presents with    Shortness of Breath     Chest tightness and feeling like she cant get a \"good breath\" . Seeing PCP , being treated with Flovent BID and ALbuterol PRN. Speaking in full sentences, sats on        History Provided By: Patient    HPI: Louise Borja, 45 y.o. female with PMHx significant for GERD, asthma, presents via private vehicle to the ED with cc of shortness of breath. Reports has been short of breath for the past 2 weeks. She reports she has a history of mild asthma. She does not have a nebulizer. She has never been hospitalized or intubated for asthma. Her primary doctor the beginning of this month and they put her on a fluticasone inhaler and refilled her albuterol inhaler. They also added Pepcid to her Prilosec regimen thinking her GERD might have exacerbated her asthma. She reports no significant improvement. She reports some chest tightness. She denies any focal chest pain. No diaphoresis. Minimal dry cough. She does have some wheezing. No leg pain or swelling. No recent plane travel. No hemoptysis. No malignancy history. No recent surgeries, traumas or hospitalizations. PMHx: Significant for GERD, asthma  PSHx: Significant for BTL  Social Hx: Non-smoker. Denies alcohol use. There are no other complaints, changes, or physical findings at this time. PCP: Rubi Tang NP    No current facility-administered medications on file prior to encounter. Current Outpatient Medications on File Prior to Encounter   Medication Sig Dispense Refill    famotidine (PEPCID) 20 mg tablet Take 1 Tablet by mouth two (2) times a day. 60 Tablet 2    fluticasone propionate (FLOVENT HFA) 44 mcg/actuation inhaler Take 1 Puff by inhalation two (2) times a day.  10.6 g 0    meclizine (ANTIVERT) 25 mg tablet TAKE 1 TABLET BY MOUTH THREE TIMES DAILY AS NEEDED FOR DIZZINESS 90 Tablet 0    Procto-Med HC 2.5 % rectal cream INSERT INTO RECTUM FOUR TIMES A DAILY 28 g 0    omeprazole (PRILOSEC) 40 mg capsule Take 1 capsule by mouth twice daily 180 Capsule 0    albuterol (PROVENTIL HFA, VENTOLIN HFA, PROAIR HFA) 90 mcg/actuation inhaler Take 2 Puffs by inhalation every four (4) hours as needed for Wheezing. 18 g 0    LORazepam (ATIVAN) 0.5 mg tablet TAKE AS DIRECTED 15 Tablet 0    docosahexanoic acid/epa (FISH OIL PO) Take  by mouth.      multivitamin (ONE A DAY) tablet Take 1 Tab by mouth daily. Past History     Past Medical History:  Past Medical History:   Diagnosis Date    Asthma     Depression     Depression with anxiety 5/12/2015    GERD (gastroesophageal reflux disease)     Menopause        Past Surgical History:  Past Surgical History:   Procedure Laterality Date    HX GYN  1994    tubal ligation       Family History:  History reviewed. No pertinent family history. Social History:  Social History     Tobacco Use    Smoking status: Never    Smokeless tobacco: Never   Vaping Use    Vaping Use: Never used   Substance Use Topics    Alcohol use: No     Alcohol/week: 0.0 standard drinks       Allergies:  No Known Allergies      Review of Systems   Review of Systems   Constitutional:  Negative for activity change, chills and fever. HENT:  Negative for congestion and sore throat. Eyes:  Negative for pain and redness. Respiratory:  Positive for cough, chest tightness and shortness of breath. Cardiovascular:  Negative for chest pain and palpitations. Gastrointestinal:  Negative for abdominal pain, diarrhea, nausea and vomiting. Genitourinary:  Negative for dysuria, frequency and urgency. Musculoskeletal:  Negative for back pain and neck pain. Skin:  Negative for rash. Neurological:  Negative for syncope, light-headedness and headaches. Psychiatric/Behavioral:  Negative for confusion.     All other systems reviewed and are negative. Physical Exam   Physical Exam  Vitals and nursing note reviewed. Constitutional:       General: She is not in acute distress. Appearance: She is well-developed. She is not diaphoretic. HENT:      Head: Normocephalic. Nose: Nose normal.      Mouth/Throat:      Pharynx: No oropharyngeal exudate. Eyes:      General: No scleral icterus. Conjunctiva/sclera: Conjunctivae normal.      Pupils: Pupils are equal, round, and reactive to light. Neck:      Thyroid: No thyromegaly. Vascular: No JVD. Trachea: No tracheal deviation. Cardiovascular:      Rate and Rhythm: Normal rate and regular rhythm. Heart sounds: No murmur heard. No friction rub. No gallop. Pulmonary:      Effort: Pulmonary effort is normal. No respiratory distress. Breath sounds: No stridor. Wheezing present. No rales. Comments: Minimal bilateral expiratory wheezing. Good air movement. Speaks in normal sentences without difficulty. Abdominal:      General: Bowel sounds are normal. There is no distension. Palpations: Abdomen is soft. Tenderness: There is no abdominal tenderness. There is no guarding or rebound. Musculoskeletal:         General: Normal range of motion. Cervical back: Normal range of motion and neck supple. Lymphadenopathy:      Cervical: No cervical adenopathy. Skin:     General: Skin is warm and dry. Findings: No erythema or rash. Neurological:      Mental Status: She is alert and oriented to person, place, and time. Cranial Nerves: No cranial nerve deficit. Motor: No abnormal muscle tone. Coordination: Coordination normal.   Psychiatric:         Behavior: Behavior normal.           Diagnostic Study Results     Labs -   No results found for this or any previous visit (from the past 12 hour(s)). Radiologic Studies -   XR CHEST PA LAT   Final Result   No acute cardiopulmonary process.            CT Results (Last 48 hours)      None          CXR Results  (Last 48 hours)                 11/16/22 1825  XR CHEST PA LAT Final result    Impression:  No acute cardiopulmonary process. Narrative:  EXAM: XR CHEST PA LAT       HISTORY: sob. COMPARISON: 8/30/2021       FINDINGS: 2 view(s) of the chest. The lungs are well inflated. No focal   consolidation, pleural effusion, or pneumothorax. The cardiomediastinal   silhouette is unremarkable. The visualized osseous structures are unremarkable. Medical Decision Making   I am the first provider for this patient. I reviewed the vital signs, available nursing notes, past medical history, past surgical history, family history and social history. Vital Signs-Reviewed the patient's vital signs. No data found. Pulse Oximetry Analysis - 100% on RA    Cardiac Monitor:   Rate: 85 bpm        Records Reviewed: Nursing Notes    Provider Notes (Medical Decision Making):   DDx: Pneumonia, asthma exacerbation, anxiety    ED Course:   Initial assessment performed. The patients presenting problems have been discussed, and they are in agreement with the care plan formulated and outlined with them. I have encouraged them to ask questions as they arise throughout their visit. PROGRESS NOTE    Pt reevaluated. X-ray clear. Patient 100% on room air. Patient afebrile nontoxic. I suspect her symptoms are related to asthma exacerbation. Will treat with Medrol Dosepak. Recommended daily Zyrtec as well. Written by Norah Soto MD     Progress note:    Pt noted to be feeling better , ready for discharge. Updated pt and/or family on all final  imaging findings. Will follow up as instructed. All questions have been answered, pt voiced understanding and agreement with plan. Specific return precautions provided as well as instructions to return to the ED should sx worsen at any time. Vital signs stable for discharge.        I have also put together some discharge instructions for them that include: 1) educational information regarding their diagnosis, 2) how to care for their diagnosis at home, as well a 3) list of reasons why they would want to return to the ED prior to their follow-up appointment, should their condition change. Written by Kristal Shrestha MD        Critical Care Time:   0    Disposition:  Discharge    PLAN:  1. Discharge Medication List as of 11/16/2022  6:51 PM        START taking these medications    Details   methylPREDNISolone (Medrol, Dell,) 4 mg tablet Take as directed, Normal, Disp-1 Dose Pack, R-0           CONTINUE these medications which have NOT CHANGED    Details   famotidine (PEPCID) 20 mg tablet Take 1 Tablet by mouth two (2) times a day., Normal, Disp-60 Tablet, R-2      fluticasone propionate (FLOVENT HFA) 44 mcg/actuation inhaler Take 1 Puff by inhalation two (2) times a day., Normal, Disp-10.6 g, R-0      meclizine (ANTIVERT) 25 mg tablet TAKE 1 TABLET BY MOUTH THREE TIMES DAILY AS NEEDED FOR DIZZINESS, Normal, Disp-90 Tablet, R-0      Procto-Med HC 2.5 % rectal cream INSERT INTO RECTUM FOUR TIMES A DAILY, Normal, Disp-28 g, R-0      omeprazole (PRILOSEC) 40 mg capsule Take 1 capsule by mouth twice daily, Normal, Disp-180 Capsule, R-0      albuterol (PROVENTIL HFA, VENTOLIN HFA, PROAIR HFA) 90 mcg/actuation inhaler Take 2 Puffs by inhalation every four (4) hours as needed for Wheezing., Normal, Disp-18 g, R-0      LORazepam (ATIVAN) 0.5 mg tablet TAKE AS DIRECTED, Normal, Disp-15 Tablet, R-0      docosahexanoic acid/epa (FISH OIL PO) Take  by mouth., Historical Med      multivitamin (ONE A DAY) tablet Take 1 Tab by mouth daily. , Historical Med           2.    Follow-up Information       Follow up With Specialties Details Why Contact Info    Yuri Parada NP Nurse Practitioner Schedule an appointment as soon as possible for a visit in 1 week  [address] [address]      [address] [place] [address] Emergency Medicine Go in 1 day If symptoms worsen [address] [facility] [place] [address]  [phone]          Return to ED if worse     Diagnosis     Clinical Impression:   1. Mild intermittent asthma with acute exacerbation              Please note that this dictation was completed with [employer], the computer voice recognition software. Quite often unanticipated grammatical, syntax, homophones, and other interpretive errors are inadvertently transcribed by the computer software. Please disregard these errors. Please excuse any errors that have escaped final proofreading.

## 2022-11-20 RX ORDER — HYDROCORTISONE 25 MG/G
CREAM TOPICAL
Qty: 28 G | Refills: 0 | Status: SHIPPED | OUTPATIENT
Start: 2022-11-20

## 2022-11-22 NOTE — PROGRESS NOTES
Adrienne Montez is a 47 y.o. female evaluated via telephone on 11/23/2022. Consent:  She and/or health care decision maker is aware that that she may receive a bill for this telephone service, depending on her insurance coverage, and has provided verbal consent to proceed: Yes    The patient was at home during the visit. I, the provider, was at the office during this visit. CC: SOB    HPI  Mrs. Ivette Meadows is a 51yo female who presents today via telephone visit to follow up for SOB. She was last seen here 2 weeks ago stated her SOB was not on exertion, there were no aggravating or relieving factors. It was felt her SOB was related to her asthma so she was started on Flovent daily and advised to cont her Albuterol prn. Today she states the inhaler did not help and she ended up in the ER on 11-16-22. A CXR was done, it was normal. She was told she had wheezing in her lungs so she was given a steroid dose pack but this has not helped either. Denies coughing or wheezing. She does report chest pain but believes this is related to GERD. She has been taking a PPI without relief so at her appt 2 weeks ago Pepcid was added but she still reports a burning in her chest.  Has been seeing GI but for a different issue (hemorrhoids with rectal bleeding). She denies any significant anxiety that would be contributing to her SOB. PLAN    SOB  Try Dulera 100-5mcg/act- Inhale 2 puffs BID given hx of asthma  Referred to cardiology for further eval  F/U 1 month, consider checking DDIMER and/or lung CT    GERD  On PPI with Pepcid without adequate control  Referred to GI, may need EGD              Documentation:  I communicated with the patient and/or health care decision maker about the plan of care as noted above. I affirm this is a Patient Initiated Episode with an Established Patient who has not had a related appointment within my department in the past 7 days or scheduled within the next 24 hours.     Total Time: minutes: 11-20 minutes    Note: not billable if this call serves to triage the patient into an appointment for the relevant concern      Monika Easton NP

## 2022-11-23 ENCOUNTER — VIRTUAL VISIT (OUTPATIENT)
Dept: FAMILY MEDICINE CLINIC | Age: 52
End: 2022-11-23
Payer: COMMERCIAL

## 2022-11-23 DIAGNOSIS — R06.02 SOB (SHORTNESS OF BREATH): Primary | ICD-10-CM

## 2022-11-23 DIAGNOSIS — K21.9 GASTROESOPHAGEAL REFLUX DISEASE, UNSPECIFIED WHETHER ESOPHAGITIS PRESENT: ICD-10-CM

## 2022-11-23 DIAGNOSIS — J45.40 MODERATE PERSISTENT ASTHMA WITHOUT COMPLICATION: ICD-10-CM

## 2022-11-23 PROCEDURE — 99442 PR PHYS/QHP TELEPHONE EVALUATION 11-20 MIN: CPT | Performed by: NURSE PRACTITIONER

## 2022-11-23 NOTE — PROGRESS NOTES
Identified pt with two pt identifiers(name and ). Reviewed record in preparation for visit and have obtained necessary documentation. Chief Complaint   Patient presents with    Shortness of Breath     Follow-up       1. \"Have you been to the ER, urgent care clinic since your last visit? Hospitalized since your last visit? \" Yes SOB    2. \"Have you seen or consulted any other health care providers outside of the [address] [place] [address] since your last visit? \" No     3. For patients aged 39-70: Has the patient had a colonoscopy / FIT/ Cologuard? Yes, No Care Gap Present        If the patient is female:    4. For patients aged 41-77: Has the patient had a mammogram within the past 2 years? No      5. For patients aged 21-65: Has the patient had a pap smear?  NO

## 2022-11-28 ENCOUNTER — TELEPHONE (OUTPATIENT)
Dept: CARDIOLOGY CLINIC | Age: 52
End: 2022-11-28

## 2022-11-28 NOTE — TELEPHONE ENCOUNTER
Patient is calling because she would like to schedule an apt with MARISELA Sanchez.     [phone]

## 2022-11-29 NOTE — TELEPHONE ENCOUNTER
Called patient and scheduled her for next available appointment with Pamella Aldana NP:    1/11/2023 @ 11:00am

## 2022-12-01 ENCOUNTER — DOCUMENTATION ONLY (OUTPATIENT)
Dept: FAMILY MEDICINE CLINIC | Age: 52
End: 2022-12-01

## 2022-12-13 DIAGNOSIS — H81.10 BENIGN PAROXYSMAL POSITIONAL VERTIGO, UNSPECIFIED LATERALITY: ICD-10-CM

## 2022-12-14 RX ORDER — MECLIZINE HYDROCHLORIDE 25 MG/1
TABLET ORAL
Qty: 90 TABLET | Refills: 0 | Status: SHIPPED | OUTPATIENT
Start: 2022-12-14

## 2022-12-14 RX ORDER — HYDROCORTISONE 25 MG/G
CREAM TOPICAL
Qty: 28 G | Refills: 0 | Status: SHIPPED | OUTPATIENT
Start: 2022-12-14

## 2023-01-13 ENCOUNTER — TELEPHONE (OUTPATIENT)
Dept: FAMILY MEDICINE CLINIC | Age: 53
End: 2023-01-13

## 2023-01-13 DIAGNOSIS — J45.40 MODERATE PERSISTENT ASTHMA WITHOUT COMPLICATION: ICD-10-CM

## 2023-01-13 RX ORDER — FLUTICASONE PROPIONATE 44 UG/1
1 AEROSOL, METERED RESPIRATORY (INHALATION) 2 TIMES DAILY
Qty: 10.6 G | Refills: 5 | Status: SHIPPED | OUTPATIENT
Start: 2023-01-13

## 2023-01-13 NOTE — TELEPHONE ENCOUNTER
It was discontinued because you tried her on a different inhaler but pt stated it was too strong & wants to go back on the flovent

## 2023-02-08 DIAGNOSIS — H81.10 BENIGN PAROXYSMAL POSITIONAL VERTIGO, UNSPECIFIED LATERALITY: ICD-10-CM

## 2023-02-09 RX ORDER — MECLIZINE HYDROCHLORIDE 25 MG/1
TABLET ORAL
Qty: 90 TABLET | Refills: 0 | Status: SHIPPED | OUTPATIENT
Start: 2023-02-09

## 2023-03-21 RX ORDER — HYDROCORTISONE 25 MG/G
CREAM TOPICAL
Qty: 28 G | Refills: 0 | Status: SHIPPED | OUTPATIENT
Start: 2023-03-21

## 2023-03-23 ENCOUNTER — HOSPITAL ENCOUNTER (OUTPATIENT)
Dept: MAMMOGRAPHY | Age: 53
Discharge: HOME | End: 2023-03-23
Payer: COMMERCIAL

## 2023-03-23 DIAGNOSIS — Z12.31 VISIT FOR SCREENING MAMMOGRAM: ICD-10-CM

## 2023-03-23 PROCEDURE — 77063 BREAST TOMOSYNTHESIS BI: CPT

## 2023-05-11 NOTE — TELEPHONE ENCOUNTER
Patient requesting refill on     Requested Prescriptions     Pending Prescriptions Disp Refills    meclizine (ANTIVERT) 25 MG tablet [Pharmacy Med Name: Meclizine HCl 25 MG Oral Tablet] 90 tablet 0     Sig: TAKE 1 TABLET BY MOUTH THREE TIMES DAILY AS NEEDED FOR DIZZINESS         Last OV 11/23/22

## 2023-05-12 RX ORDER — MECLIZINE HYDROCHLORIDE 25 MG/1
TABLET ORAL
Qty: 90 TABLET | Refills: 0 | Status: SHIPPED | OUTPATIENT
Start: 2023-05-12

## 2023-06-19 RX ORDER — FAMOTIDINE 20 MG/1
TABLET, FILM COATED ORAL
Qty: 160 TABLET | Refills: 0 | Status: SHIPPED | OUTPATIENT
Start: 2023-06-19

## 2023-06-19 NOTE — TELEPHONE ENCOUNTER
Office Visit on 11/07/2022   Component Date Value Ref Range Status    Hemoglobin, POC 11/07/2022 13.1  G/DL Final

## 2023-07-28 NOTE — TELEPHONE ENCOUNTER
Patient requesting refill on     Requested Prescriptions     Pending Prescriptions Disp Refills    albuterol sulfate HFA (PROVENTIL;VENTOLIN;PROAIR) 108 (90 Base) MCG/ACT inhaler [Pharmacy Med Name: Albuterol Sulfate  (90 Base) MCG/ACT Inhalation Aerosol Solution] 18 g 0     Sig: INHALE 2 PUFFS BY MOUTH EVERY 4 HOURS AS NEEDED FOR WHEEZING        Last OV 11/23/2022     Patient will need appointment

## 2023-07-29 RX ORDER — ALBUTEROL SULFATE 90 UG/1
AEROSOL, METERED RESPIRATORY (INHALATION)
Qty: 18 G | Refills: 0 | Status: SHIPPED | OUTPATIENT
Start: 2023-07-29

## 2023-08-25 RX ORDER — MECLIZINE HYDROCHLORIDE 25 MG/1
TABLET ORAL
Qty: 90 TABLET | Refills: 0 | Status: SHIPPED | OUTPATIENT
Start: 2023-08-25

## 2023-08-29 ENCOUNTER — NURSE ONLY (OUTPATIENT)
Dept: FAMILY MEDICINE CLINIC | Age: 53
End: 2023-08-29

## 2023-08-29 ENCOUNTER — SCHEDULED TELEPHONE ENCOUNTER (OUTPATIENT)
Dept: FAMILY MEDICINE CLINIC | Age: 53
End: 2023-08-29

## 2023-08-29 DIAGNOSIS — J10.1 INFLUENZA A: Primary | ICD-10-CM

## 2023-08-29 DIAGNOSIS — R68.89 FLU-LIKE SYMPTOMS: ICD-10-CM

## 2023-08-29 LAB
EXP DATE SOLUTION: NORMAL
EXP DATE SWAB: NORMAL
EXPIRATION DATE: NORMAL
INFLUENZA A ANTIGEN, POC: POSITIVE
INFLUENZA B ANTIGEN, POC: NEGATIVE
LOT NUMBER POC: NORMAL
LOT NUMBER SOLUTION: NORMAL
LOT NUMBER SWAB: NORMAL
SARS-COV-2 RNA, POC: NEGATIVE
VALID INTERNAL CONTROL, POC: ABNORMAL

## 2023-08-29 PROCEDURE — 87502 INFLUENZA DNA AMP PROBE: CPT

## 2023-08-29 PROCEDURE — 87635 SARS-COV-2 COVID-19 AMP PRB: CPT

## 2023-08-29 PROCEDURE — 99447 NTRPROF PH1/NTRNET/EHR 11-20: CPT | Performed by: PHYSICIAN ASSISTANT

## 2023-08-29 RX ORDER — OSELTAMIVIR PHOSPHATE 75 MG/1
75 CAPSULE ORAL 2 TIMES DAILY
Qty: 10 CAPSULE | Refills: 0 | Status: SHIPPED | OUTPATIENT
Start: 2023-08-29 | End: 2023-09-03

## 2023-08-29 SDOH — ECONOMIC STABILITY: FOOD INSECURITY: WITHIN THE PAST 12 MONTHS, THE FOOD YOU BOUGHT JUST DIDN'T LAST AND YOU DIDN'T HAVE MONEY TO GET MORE.: NEVER TRUE

## 2023-08-29 SDOH — ECONOMIC STABILITY: INCOME INSECURITY: HOW HARD IS IT FOR YOU TO PAY FOR THE VERY BASICS LIKE FOOD, HOUSING, MEDICAL CARE, AND HEATING?: NOT HARD AT ALL

## 2023-08-29 SDOH — ECONOMIC STABILITY: FOOD INSECURITY: WITHIN THE PAST 12 MONTHS, YOU WORRIED THAT YOUR FOOD WOULD RUN OUT BEFORE YOU GOT MONEY TO BUY MORE.: NEVER TRUE

## 2023-08-29 SDOH — ECONOMIC STABILITY: HOUSING INSECURITY
IN THE LAST 12 MONTHS, WAS THERE A TIME WHEN YOU DID NOT HAVE A STEADY PLACE TO SLEEP OR SLEPT IN A SHELTER (INCLUDING NOW)?: NO

## 2023-08-29 ASSESSMENT — PATIENT HEALTH QUESTIONNAIRE - PHQ9
2. FEELING DOWN, DEPRESSED OR HOPELESS: 0
SUM OF ALL RESPONSES TO PHQ9 QUESTIONS 1 & 2: 0
SUM OF ALL RESPONSES TO PHQ QUESTIONS 1-9: 0
1. LITTLE INTEREST OR PLEASURE IN DOING THINGS: 0
SUM OF ALL RESPONSES TO PHQ QUESTIONS 1-9: 0

## 2023-08-29 NOTE — PROGRESS NOTES
Documentation:  I communicated with the patient and/or health care decision maker about feeling bad since yesterday with fever, chills, and cough. Took covid test, but it was  (and neg). Chills all night, temp up/down all night. Last was 101.9 feeling kind of sob and wheezing, says she feels like congestion in her chest. Cough is not very productive, a little yellow this AM only in small amt. Denies n/v/d or abd pain. Alt Dayquil/Nyquil. No sick contacts or travel. Details of this discussion including any medical advice provided: pos for flu a. Encourage rest & fluids. Discussed otc medications for symptomatic relief. Pt would like to try Tamiflu. Discussed this tx option vs conservative. RTO if sxs persist/worsen or develops any additional sxs/concerns. Seek immediate care/to ER for any \"red flag\" sxs. Pt verbalizes understanding and agrees with the plan. Total Time: minutes: 11-20 minutes    Wilhemina Situ was evaluated through a synchronous (real-time) audio encounter. Patient identification was verified at the start of the visit. She (or guardian if applicable) is aware that this is a billable service, which includes applicable co-pays. This visit was conducted with the patient's (and/or legal guardian's) verbal consent. She has not had a related appointment within my department in the past 7 days or scheduled within the next 24 hours. The patient was located at Home: [place] [address]. The provider was located at [facility]): [address] [facility] [place],  [address] Note: not billable if this call serves to triage the patient into an appointment for the relevant concern    STOP! Confirm you are appropriately licensed, registered, or certified to deliver care in the state where the patient is located as indicated above. If you are not or unsure, please re-schedule the visit.     Are you appropriately licensed in the patient's state?:

## 2023-09-06 ENCOUNTER — OFFICE VISIT (OUTPATIENT)
Age: 53
End: 2023-09-06
Payer: COMMERCIAL

## 2023-09-06 VITALS
HEART RATE: 76 BPM | TEMPERATURE: 97 F | RESPIRATION RATE: 18 BRPM | BODY MASS INDEX: 41.59 KG/M2 | WEIGHT: 226 LBS | DIASTOLIC BLOOD PRESSURE: 88 MMHG | OXYGEN SATURATION: 97 % | HEIGHT: 62 IN | SYSTOLIC BLOOD PRESSURE: 136 MMHG

## 2023-09-06 DIAGNOSIS — J45.21 MILD INTERMITTENT ASTHMA WITH (ACUTE) EXACERBATION: ICD-10-CM

## 2023-09-06 DIAGNOSIS — E87.6 HYPOKALEMIA: Primary | ICD-10-CM

## 2023-09-06 PROCEDURE — 99213 OFFICE O/P EST LOW 20 MIN: CPT | Performed by: NURSE PRACTITIONER

## 2023-09-06 RX ORDER — PREDNISONE 10 MG/1
TABLET ORAL
Qty: 21 TABLET | Refills: 0 | Status: SHIPPED | OUTPATIENT
Start: 2023-09-06

## 2023-09-06 SDOH — ECONOMIC STABILITY: FOOD INSECURITY: WITHIN THE PAST 12 MONTHS, YOU WORRIED THAT YOUR FOOD WOULD RUN OUT BEFORE YOU GOT MONEY TO BUY MORE.: NEVER TRUE

## 2023-09-06 SDOH — ECONOMIC STABILITY: FOOD INSECURITY: WITHIN THE PAST 12 MONTHS, THE FOOD YOU BOUGHT JUST DIDN'T LAST AND YOU DIDN'T HAVE MONEY TO GET MORE.: NEVER TRUE

## 2023-09-06 SDOH — ECONOMIC STABILITY: INCOME INSECURITY: HOW HARD IS IT FOR YOU TO PAY FOR THE VERY BASICS LIKE FOOD, HOUSING, MEDICAL CARE, AND HEATING?: NOT HARD AT ALL

## 2023-09-06 ASSESSMENT — ENCOUNTER SYMPTOMS
SHORTNESS OF BREATH: 1
EYE DISCHARGE: 0
COLOR CHANGE: 0
ABDOMINAL DISTENTION: 0
WHEEZING: 1

## 2023-09-07 LAB — POTASSIUM SERPL-SCNC: 4.2 MMOL/L (ref 3.5–5.1)

## 2023-09-09 ENCOUNTER — TELEPHONE (OUTPATIENT)
Age: 53
End: 2023-09-09

## 2023-09-09 RX ORDER — AZITHROMYCIN 250 MG/1
250 TABLET, FILM COATED ORAL SEE ADMIN INSTRUCTIONS
Qty: 6 TABLET | Refills: 0 | Status: SHIPPED | OUTPATIENT
Start: 2023-09-09 | End: 2023-09-14

## 2023-09-09 NOTE — TELEPHONE ENCOUNTER
Rec'd a call from patient this morning. She left a message stating she tested positive for the Flu on 8-29-23. Went to the ER and was treated with Tamiflu. She followed up on 9-6-23 for an asthma exacerbation. She was prescribed prednisone and is on day 4 and still coughing up yellow phlegm with SOB and wheezing. Feels she would benefit from an antibiotic. I called her back but got no answer and the VM box was full. Script sent for a ZPACK to her pharmacy. I will also send her a [employer] message.

## 2023-09-11 ENCOUNTER — TELEPHONE (OUTPATIENT)
Age: 53
End: 2023-09-11

## 2023-09-11 NOTE — TELEPHONE ENCOUNTER
Pt was seen on 9/6 and is still having trouble breathing even when at rest. What would you suggest? She's asking for a nebulizer treatment

## 2023-09-11 NOTE — TELEPHONE ENCOUNTER
I sent her a ZPACK over the weekend, did she pick it up?  I think she needs to go back to urgent care to be re-evaluated

## 2023-09-18 RX ORDER — IPRATROPIUM BROMIDE AND ALBUTEROL SULFATE 2.5; .5 MG/3ML; MG/3ML
1 SOLUTION RESPIRATORY (INHALATION) EVERY 4 HOURS PRN
Qty: 30 EACH | Refills: 0 | Status: SHIPPED | OUTPATIENT
Start: 2023-09-18 | End: 2023-10-18

## 2023-10-06 RX ORDER — FAMOTIDINE 20 MG/1
20 TABLET, FILM COATED ORAL 2 TIMES DAILY
Qty: 180 TABLET | Refills: 1 | Status: SHIPPED | OUTPATIENT
Start: 2023-10-06

## 2023-10-11 RX ORDER — HYDROCORTISONE 25 MG/G
CREAM TOPICAL
Qty: 28 G | Refills: 0 | Status: SHIPPED | OUTPATIENT
Start: 2023-10-11

## 2023-10-11 NOTE — TELEPHONE ENCOUNTER
Patient requesting refill on     Requested Prescriptions     Pending Prescriptions Disp Refills    PROCTO-MED HC 2.5 % CREA rectal cream [Pharmacy Med Name: Procto-Med HC 2.5 % External Cream] 28 g 0     Sig: INSERT INTO RECTUM 4 TIMES A DAY        Last OV 09/6/23 with Cam[place]

## 2024-01-03 RX ORDER — OMEPRAZOLE 40 MG/1
40 CAPSULE, DELAYED RELEASE ORAL 2 TIMES DAILY
Qty: 180 CAPSULE | Refills: 0 | Status: SHIPPED | OUTPATIENT
Start: 2024-01-03

## 2024-01-03 RX ORDER — MECLIZINE HYDROCHLORIDE 25 MG/1
TABLET ORAL
Qty: 90 TABLET | Refills: 0 | Status: SHIPPED | OUTPATIENT
Start: 2024-01-03

## 2024-04-03 RX ORDER — FLUTICASONE PROPIONATE 44 MCG
1 AEROSOL WITH ADAPTER (GRAM) INHALATION 2 TIMES DAILY
Qty: 10.6 G | Refills: 0 | Status: SHIPPED | OUTPATIENT
Start: 2024-04-03

## 2024-04-03 NOTE — TELEPHONE ENCOUNTER
Will give one month's supply for now but future refills will require an appt. She is due for a 6 month follow up

## 2024-05-02 ENCOUNTER — OFFICE VISIT (OUTPATIENT)
Age: 54
End: 2024-05-02
Payer: COMMERCIAL

## 2024-05-02 VITALS
WEIGHT: 227 LBS | HEART RATE: 86 BPM | OXYGEN SATURATION: 98 % | HEIGHT: 62 IN | SYSTOLIC BLOOD PRESSURE: 122 MMHG | TEMPERATURE: 97.7 F | RESPIRATION RATE: 16 BRPM | BODY MASS INDEX: 41.77 KG/M2 | DIASTOLIC BLOOD PRESSURE: 78 MMHG

## 2024-05-02 DIAGNOSIS — K64.8 OTHER HEMORRHOIDS: ICD-10-CM

## 2024-05-02 DIAGNOSIS — J45.21 MILD INTERMITTENT ASTHMA WITH (ACUTE) EXACERBATION: ICD-10-CM

## 2024-05-02 DIAGNOSIS — E78.00 PURE HYPERCHOLESTEROLEMIA: ICD-10-CM

## 2024-05-02 DIAGNOSIS — R53.82 CHRONIC FATIGUE: Primary | ICD-10-CM

## 2024-05-02 DIAGNOSIS — F41.9 ANXIETY: ICD-10-CM

## 2024-05-02 DIAGNOSIS — K21.9 GASTRO-ESOPHAGEAL REFLUX DISEASE WITHOUT ESOPHAGITIS: ICD-10-CM

## 2024-05-02 PROCEDURE — 99214 OFFICE O/P EST MOD 30 MIN: CPT | Performed by: NURSE PRACTITIONER

## 2024-05-02 RX ORDER — HYDROCORTISONE 25 MG/G
CREAM TOPICAL
Qty: 28 G | Refills: 1 | Status: SHIPPED | OUTPATIENT
Start: 2024-05-02

## 2024-05-02 RX ORDER — FLUTICASONE PROPIONATE 44 MCG
1 AEROSOL WITH ADAPTER (GRAM) INHALATION 2 TIMES DAILY
Qty: 10.6 G | Refills: 5 | Status: SHIPPED | OUTPATIENT
Start: 2024-05-02 | End: 2024-05-03 | Stop reason: SDUPTHER

## 2024-05-02 ASSESSMENT — PATIENT HEALTH QUESTIONNAIRE - PHQ9
10. IF YOU CHECKED OFF ANY PROBLEMS, HOW DIFFICULT HAVE THESE PROBLEMS MADE IT FOR YOU TO DO YOUR WORK, TAKE CARE OF THINGS AT HOME, OR GET ALONG WITH OTHER PEOPLE: NOT DIFFICULT AT ALL
SUM OF ALL RESPONSES TO PHQ QUESTIONS 1-9: 2
SUM OF ALL RESPONSES TO PHQ QUESTIONS 1-9: 2
1. LITTLE INTEREST OR PLEASURE IN DOING THINGS: NOT AT ALL
9. THOUGHTS THAT YOU WOULD BE BETTER OFF DEAD, OR OF HURTING YOURSELF: NOT AT ALL
3. TROUBLE FALLING OR STAYING ASLEEP: SEVERAL DAYS
2. FEELING DOWN, DEPRESSED OR HOPELESS: NOT AT ALL
7. TROUBLE CONCENTRATING ON THINGS, SUCH AS READING THE NEWSPAPER OR WATCHING TELEVISION: NOT AT ALL
SUM OF ALL RESPONSES TO PHQ QUESTIONS 1-9: 2
8. MOVING OR SPEAKING SO SLOWLY THAT OTHER PEOPLE COULD HAVE NOTICED. OR THE OPPOSITE, BEING SO FIGETY OR RESTLESS THAT YOU HAVE BEEN MOVING AROUND A LOT MORE THAN USUAL: NOT AT ALL
6. FEELING BAD ABOUT YOURSELF - OR THAT YOU ARE A FAILURE OR HAVE LET YOURSELF OR YOUR FAMILY DOWN: NOT AT ALL
4. FEELING TIRED OR HAVING LITTLE ENERGY: SEVERAL DAYS
5. POOR APPETITE OR OVEREATING: NOT AT ALL
SUM OF ALL RESPONSES TO PHQ QUESTIONS 1-9: 2
SUM OF ALL RESPONSES TO PHQ9 QUESTIONS 1 & 2: 0

## 2024-05-02 NOTE — PROGRESS NOTES
Chief Complaint   Patient presents with    Asthma     Follow-up       Vitals:    05/02/24 0814   BP: 122/78   Pulse: 86   Resp: 16   Temp: 97.7 °F (36.5 °C)   SpO2: 98%   \"Have you been to the ER, urgent care clinic since your last visit?  Hospitalized since your last visit?\"    NO    \"Have you been to the ER, urgent care clinic since your last visit?  Hospitalized since your last visit?\"    NO    “Have you seen or consulted any other health care providers outside of [facility] since your last visit?”    NO     “Have you had a pap smear?”    NO    No cervical cancer screening on file             Click Here for Release of Records Request   “Have you seen or consulted any other health care providers outside of [facility] since your last visit?”    NO     “Have you had a pap smear?”    NO    No cervical cancer screening on file             Click Here for Release of Records Request  Chief Complaint   Patient presents with    Asthma     Follow-up       Vitals:    05/02/24 0814   BP: 122/78   Pulse: 86   Resp: 16   Temp: 97.7 °F (36.5 °C)   SpO2: 98%     
Patient labs drawn in left arm per NP orders. Patient tolerated well.    
normal.  Psych: Mood and thought content appropriate for situation. Dressed appropriately and with good hygiene.  Skin: Warm, dry, and intact. No lesions or discoloration.    Assessment/ Plan:     Fatigue  Check labs.  If normal, consider screening for sleep apnea or starting a sleep aid    GERD  Well-controlled  Continue current meds  Avoid triggers    Asthma  Symptom well-controlled at this time.  Continue current meds  Avoid triggers    Anxiety  Well-controlled  Takes ativan as needed very sparingly.  No refills needed today    HLD  Check lipids  Low-fat diet   work on weight loss    Obesity  Weight 227 pounds, BMI 41  She would like to lose weight but has no time to exercise.   Tried keto diet in the past and dropped 30 pounds but it caused her stomach issues so she could not maintain it.   Interested in weight loss drugs but her insurance will not cover.   She was given information for the [facility] as well as [facility] here in [place]      Hemorrhoids  Denies any issues currently.   Avoid constipation  Continue hydrocortisone cream as needed.     Vertigo  No current issues.    Health maintenance  PAP- due, she will call her Gyn soon  Mammo- done 3-2023, she would like to repeat in 2025  Colonoscopy- done 9-2019, repeat in 10 years  PNA vaccines- due for Prevnar 20- declines  Shingrix- due- not interested  Covid vaccines- she rec'd both shots but no boosters        Verbal and written instructions (see AVS) provided.  Patient expresses understanding of diagnosis and treatment plan.    Health Maintenance Due   Topic Date Due    Pneumococcal 0-64 years Vaccine (1 of 2 - PCV) Never done    HIV screen  Never done    Hepatitis C screen  Never done    Cervical cancer screen  Never done    Hepatitis B vaccine (3 of 3 - 19+ 3-dose series) 12/27/2005    Shingles vaccine (1 of 2) Never done    COVID-19 Vaccine (3 - 2023-24 season) 09/01/2023         No follow-up provider

## 2024-05-03 PROBLEM — E55.9 VITAMIN D DEFICIENCY: Status: ACTIVE | Noted: 2024-05-03

## 2024-05-03 LAB
25(OH)D3 SERPL-MCNC: 20.5 NG/ML (ref 30–100)
ALBUMIN SERPL-MCNC: 3.5 G/DL (ref 3.5–5)
ALBUMIN/GLOB SERPL: 1 (ref 1.1–2.2)
ALP SERPL-CCNC: 80 U/L (ref 45–117)
ALT SERPL-CCNC: 29 U/L (ref 12–78)
ANION GAP SERPL CALC-SCNC: 7 MMOL/L (ref 5–15)
AST SERPL-CCNC: 18 U/L (ref 15–37)
BASOPHILS # BLD: 0 K/UL (ref 0–0.1)
BASOPHILS NFR BLD: 0 % (ref 0–1)
BILIRUB SERPL-MCNC: 1 MG/DL (ref 0.2–1)
BUN SERPL-MCNC: 12 MG/DL (ref 6–20)
BUN/CREAT SERPL: 14 (ref 12–20)
CALCIUM SERPL-MCNC: 9.2 MG/DL (ref 8.5–10.1)
CHLORIDE SERPL-SCNC: 110 MMOL/L (ref 97–108)
CHOLEST SERPL-MCNC: 195 MG/DL
CO2 SERPL-SCNC: 24 MMOL/L (ref 21–32)
CREAT SERPL-MCNC: 0.84 MG/DL (ref 0.55–1.02)
DIFFERENTIAL METHOD BLD: NORMAL
EOSINOPHIL # BLD: 0.3 K/UL (ref 0–0.4)
EOSINOPHIL NFR BLD: 5 % (ref 0–7)
ERYTHROCYTE [DISTWIDTH] IN BLOOD BY AUTOMATED COUNT: 12.6 % (ref 11.5–14.5)
GLOBULIN SER CALC-MCNC: 3.6 G/DL (ref 2–4)
GLUCOSE SERPL-MCNC: 101 MG/DL (ref 65–100)
HCT VFR BLD AUTO: 40.9 % (ref 35–47)
HDLC SERPL-MCNC: 59 MG/DL
HDLC SERPL: 3.3 (ref 0–5)
HGB BLD-MCNC: 13.4 G/DL (ref 11.5–16)
IMM GRANULOCYTES # BLD AUTO: 0 K/UL (ref 0–0.04)
IMM GRANULOCYTES NFR BLD AUTO: 0 % (ref 0–0.5)
LDLC SERPL CALC-MCNC: 123.6 MG/DL (ref 0–100)
LYMPHOCYTES # BLD: 2.6 K/UL (ref 0.8–3.5)
LYMPHOCYTES NFR BLD: 38 % (ref 12–49)
MCH RBC QN AUTO: 29.2 PG (ref 26–34)
MCHC RBC AUTO-ENTMCNC: 32.8 G/DL (ref 30–36.5)
MCV RBC AUTO: 89.1 FL (ref 80–99)
MONOCYTES # BLD: 0.6 K/UL (ref 0–1)
MONOCYTES NFR BLD: 9 % (ref 5–13)
NEUTS SEG # BLD: 3.1 K/UL (ref 1.8–8)
NEUTS SEG NFR BLD: 48 % (ref 32–75)
NRBC # BLD: 0 K/UL (ref 0–0.01)
NRBC BLD-RTO: 0 PER 100 WBC
PLATELET # BLD AUTO: 257 K/UL (ref 150–400)
PMV BLD AUTO: 11.5 FL (ref 8.9–12.9)
POTASSIUM SERPL-SCNC: 4.1 MMOL/L (ref 3.5–5.1)
PROT SERPL-MCNC: 7.1 G/DL (ref 6.4–8.2)
RBC # BLD AUTO: 4.59 M/UL (ref 3.8–5.2)
SODIUM SERPL-SCNC: 141 MMOL/L (ref 136–145)
TRIGL SERPL-MCNC: 62 MG/DL
TSH SERPL DL<=0.05 MIU/L-ACNC: 1.55 UIU/ML (ref 0.36–3.74)
VLDLC SERPL CALC-MCNC: 12.4 MG/DL
WBC # BLD AUTO: 6.7 K/UL (ref 3.6–11)

## 2024-05-03 RX ORDER — FLUTICASONE PROPIONATE 44 UG/1
2 AEROSOL, METERED RESPIRATORY (INHALATION) 2 TIMES DAILY
Qty: 10.6 G | Refills: 5 | Status: SHIPPED | OUTPATIENT
Start: 2024-05-03

## 2024-05-06 RX ORDER — FLUTICASONE PROPIONATE 44 UG/1
AEROSOL, METERED RESPIRATORY (INHALATION)
Qty: 11 G | Refills: 5 | OUTPATIENT
Start: 2024-05-06

## 2024-06-18 RX ORDER — ALBUTEROL SULFATE 90 UG/1
AEROSOL, METERED RESPIRATORY (INHALATION)
Qty: 18 G | Refills: 0 | Status: SHIPPED | OUTPATIENT
Start: 2024-06-18

## 2024-07-31 RX ORDER — MECLIZINE HYDROCHLORIDE 25 MG/1
TABLET ORAL
Qty: 90 TABLET | Refills: 0 | Status: SHIPPED | OUTPATIENT
Start: 2024-07-31

## 2024-08-21 RX ORDER — FAMOTIDINE 20 MG/1
20 TABLET, FILM COATED ORAL 2 TIMES DAILY
Qty: 180 TABLET | Refills: 0 | Status: SHIPPED | OUTPATIENT
Start: 2024-08-21

## 2024-08-21 RX ORDER — OMEPRAZOLE 40 MG/1
40 CAPSULE, DELAYED RELEASE ORAL 2 TIMES DAILY
Qty: 180 CAPSULE | Refills: 0 | Status: SHIPPED | OUTPATIENT
Start: 2024-08-21

## 2024-10-19 RX ORDER — MECLIZINE HYDROCHLORIDE 25 MG/1
TABLET ORAL
Qty: 30 TABLET | Refills: 0 | Status: SHIPPED | OUTPATIENT
Start: 2024-10-19

## 2024-11-21 ENCOUNTER — HOSPITAL ENCOUNTER (EMERGENCY)
Facility: HOSPITAL | Age: 54
Discharge: HOME | End: 2024-11-21
Attending: FAMILY MEDICINE | Admitting: FAMILY MEDICINE

## 2024-11-21 VITALS
WEIGHT: 220 LBS | RESPIRATION RATE: 16 BRPM | OXYGEN SATURATION: 99 % | TEMPERATURE: 98.5 F | DIASTOLIC BLOOD PRESSURE: 86 MMHG | BODY MASS INDEX: 40.48 KG/M2 | SYSTOLIC BLOOD PRESSURE: 158 MMHG | HEIGHT: 62 IN | HEART RATE: 88 BPM

## 2024-11-21 DIAGNOSIS — J01.11 ACUTE RECURRENT FRONTAL SINUSITIS: Primary | ICD-10-CM

## 2024-11-21 DIAGNOSIS — H61.23 BILATERAL IMPACTED CERUMEN: ICD-10-CM

## 2024-11-21 LAB
FLUAV RNA SPEC QL NAA+PROBE: NOT DETECTED
FLUBV RNA SPEC QL NAA+PROBE: NOT DETECTED
SARS-COV-2 RNA RESP QL NAA+PROBE: NOT DETECTED
SOURCE: NORMAL

## 2024-11-21 PROCEDURE — 99283 EMERGENCY DEPT VISIT LOW MDM: CPT

## 2024-11-21 PROCEDURE — 87636 SARSCOV2 & INF A&B AMP PRB: CPT

## 2024-11-21 PROCEDURE — 6370000000 HC RX 637 (ALT 250 FOR IP): Performed by: FAMILY MEDICINE

## 2024-11-21 RX ORDER — OXYMETAZOLINE HYDROCHLORIDE 0.05 G/100ML
2 SPRAY NASAL
Status: COMPLETED | OUTPATIENT
Start: 2024-11-21 | End: 2024-11-21

## 2024-11-21 RX ADMIN — AMOXICILLIN AND CLAVULANATE POTASSIUM 1 TABLET: 875; 125 TABLET, FILM COATED ORAL at 19:44

## 2024-11-21 RX ADMIN — OXYMETAZOLINE HYDROCHLORIDE 2 SPRAY: 0.5 SPRAY NASAL at 19:45

## 2024-11-21 ASSESSMENT — LIFESTYLE VARIABLES
HOW MANY STANDARD DRINKS CONTAINING ALCOHOL DO YOU HAVE ON A TYPICAL DAY: PATIENT DOES NOT DRINK
HOW OFTEN DO YOU HAVE A DRINK CONTAINING ALCOHOL: NEVER

## 2024-11-21 ASSESSMENT — PAIN SCALES - GENERAL: PAINLEVEL_OUTOF10: 8

## 2024-11-21 ASSESSMENT — PAIN - FUNCTIONAL ASSESSMENT: PAIN_FUNCTIONAL_ASSESSMENT: 0-10

## 2024-11-21 ASSESSMENT — PAIN DESCRIPTION - LOCATION: LOCATION: HEAD

## 2024-11-21 NOTE — ED TRIAGE NOTES
Pt reports sinus pressure, cough and congestion x 1 week. Has tried OTC remedies without any relief. Also reports increased use of nebulizers.

## 2024-11-22 NOTE — ED PROVIDER NOTES
resolved.  She has had sinus infections in the past and this seems very similar.  She has a chronic cough that seems to be from postnasal drip. Since the patient has been having symptoms for over a week, and she has had multiple OTC medications that she has been trying for her cold, it is reasonable to give her antibiotics for a week.  She was also suggested to use some Afrin.  She was also encouraged to use a cerumen removal kit from [employer] or the [employer].  She was given off work for tomorrow.         Disposition Considerations (Tests not done, Shared Decision Making, Pt Expectation of Test or Tx.): None    FINAL IMPRESSION     1. Acute recurrent frontal sinusitis    2. Bilateral impacted cerumen          DISPOSITION/PLAN   DISPOSITION Decision To Discharge 11/21/2024 07:36:01 PM           Discharge Note: The patient is stable for discharge home. The signs, symptoms, diagnosis, and discharge instructions have been discussed, understanding conveyed, and agreed upon. The patient is to follow up as recommended or return to ER should their symptoms worsen.      PATIENT REFERRED TO:  Bere Walters APRN - NP  [address] [facility] [address]  [phone]    In 1 week           DISCHARGE MEDICATIONS:     Medication List        START taking these medications      amoxicillin-clavulanate 875-125 MG per tablet  Commonly known as: AUGMENTIN  Take 1 tablet by mouth 2 times daily for 7 days            CONTINUE taking these medications      Compressor/Nebulizer Misc  Use every 4 hours as needed for wheezing or SOB            ASK your doctor about these medications      albuterol sulfate  (90 Base) MCG/ACT inhaler  Commonly known as: PROVENTIL;VENTOLIN;PROAIR  INHALE 2 PUFFS BY MOUTH EVERY 4 HOURS AS NEEDED FOR WHEEZING     famotidine 20 MG tablet  Commonly known as: PEPCID  Take 1 tablet by mouth twice daily     fluticasone 44 MCG/ACT inhaler  Commonly known as: Flovent HFA  Inhale 2 puffs into the lungs in the

## 2024-11-22 NOTE — DISCHARGE INSTRUCTIONS
-- Augmentin 875 mg twice daily for 7 days.  Take with food.  --Afrin nasal spray 1 squirt to each nostril every 8 hours for 3 days.  --Continue with your Claritin for the next week or 2.  --Ibuprofen 600 mg every 6 hours as needed for pain.  Take with food.  Okay to take with Tylenol 1000 mg every 6 hours as needed for headache.  --Off work tomorrow.

## 2024-11-27 RX ORDER — MECLIZINE HYDROCHLORIDE 25 MG/1
TABLET ORAL
Qty: 30 TABLET | Refills: 0 | Status: SHIPPED | OUTPATIENT
Start: 2024-11-27

## 2025-01-27 RX ORDER — MECLIZINE HYDROCHLORIDE 25 MG/1
TABLET ORAL
Qty: 30 TABLET | Refills: 1 | Status: SHIPPED | OUTPATIENT
Start: 2025-01-27

## 2025-06-11 RX ORDER — MECLIZINE HYDROCHLORIDE 25 MG/1
25 TABLET ORAL 3 TIMES DAILY PRN
Qty: 30 TABLET | Refills: 1 | Status: SHIPPED | OUTPATIENT
Start: 2025-06-11